# Patient Record
Sex: MALE | Race: WHITE | Employment: FULL TIME | URBAN - METROPOLITAN AREA
[De-identification: names, ages, dates, MRNs, and addresses within clinical notes are randomized per-mention and may not be internally consistent; named-entity substitution may affect disease eponyms.]

---

## 2017-05-08 PROBLEM — E86.0 DEHYDRATION: Status: ACTIVE | Noted: 2017-05-08

## 2017-05-08 PROBLEM — M62.82 RHABDOMYOLYSIS: Status: ACTIVE | Noted: 2017-05-08

## 2017-05-08 PROBLEM — N17.9 AKI (ACUTE KIDNEY INJURY) (HCC): Status: ACTIVE | Noted: 2017-05-08

## 2018-09-26 PROBLEM — E86.0 DEHYDRATION: Status: RESOLVED | Noted: 2017-05-08 | Resolved: 2018-09-26

## 2022-02-25 ENCOUNTER — ANESTHESIA (OUTPATIENT)
Dept: OPERATING ROOM | Age: 62
DRG: 854 | End: 2022-02-25
Payer: COMMERCIAL

## 2022-02-25 ENCOUNTER — HOSPITAL ENCOUNTER (INPATIENT)
Age: 62
LOS: 3 days | Discharge: HOME | DRG: 854 | End: 2022-02-28
Attending: EMERGENCY MEDICINE | Admitting: INTERNAL MEDICINE
Payer: COMMERCIAL

## 2022-02-25 ENCOUNTER — ANESTHESIA EVENT (OUTPATIENT)
Dept: OPERATING ROOM | Age: 62
DRG: 854 | End: 2022-02-25
Payer: COMMERCIAL

## 2022-02-25 ENCOUNTER — APPOINTMENT (OUTPATIENT)
Dept: CT IMAGING | Age: 62
DRG: 854 | End: 2022-02-25
Payer: COMMERCIAL

## 2022-02-25 ENCOUNTER — APPOINTMENT (OUTPATIENT)
Dept: GENERAL RADIOLOGY | Age: 62
DRG: 854 | End: 2022-02-25
Payer: COMMERCIAL

## 2022-02-25 VITALS
RESPIRATION RATE: 2 BRPM | OXYGEN SATURATION: 97 % | SYSTOLIC BLOOD PRESSURE: 122 MMHG | TEMPERATURE: 100.4 F | DIASTOLIC BLOOD PRESSURE: 66 MMHG

## 2022-02-25 DIAGNOSIS — N20.1 URETEROLITHIASIS: Primary | ICD-10-CM

## 2022-02-25 PROBLEM — A41.9 SEPSIS (HCC): Status: ACTIVE | Noted: 2022-02-25

## 2022-02-25 LAB
A/G RATIO: 0.9 (ref 1.1–2.2)
ALBUMIN SERPL-MCNC: 4.1 G/DL (ref 3.4–5)
ALP BLD-CCNC: 135 U/L (ref 40–129)
ALT SERPL-CCNC: 18 U/L (ref 10–40)
ANION GAP SERPL CALCULATED.3IONS-SCNC: 16 MMOL/L (ref 3–16)
AST SERPL-CCNC: 15 U/L (ref 15–37)
BACTERIA: ABNORMAL /HPF
BASOPHILS ABSOLUTE: 0.1 K/UL (ref 0–0.2)
BASOPHILS RELATIVE PERCENT: 0.4 %
BILIRUB SERPL-MCNC: 1.2 MG/DL (ref 0–1)
BILIRUBIN URINE: NEGATIVE
BLOOD, URINE: ABNORMAL
BUN BLDV-MCNC: 14 MG/DL (ref 7–20)
CALCIUM SERPL-MCNC: 9.5 MG/DL (ref 8.3–10.6)
CHLORIDE BLD-SCNC: 96 MMOL/L (ref 99–110)
CLARITY: ABNORMAL
CO2: 22 MMOL/L (ref 21–32)
COLOR: YELLOW
CREAT SERPL-MCNC: 1 MG/DL (ref 0.9–1.3)
EOSINOPHILS ABSOLUTE: 0 K/UL (ref 0–0.6)
EOSINOPHILS RELATIVE PERCENT: 0.1 %
EPITHELIAL CELLS, UA: 2 /HPF (ref 0–5)
GFR AFRICAN AMERICAN: >60
GFR NON-AFRICAN AMERICAN: >60
GLUCOSE BLD-MCNC: 220 MG/DL (ref 70–99)
GLUCOSE BLD-MCNC: 243 MG/DL (ref 70–99)
GLUCOSE BLD-MCNC: 245 MG/DL (ref 70–99)
GLUCOSE BLD-MCNC: 249 MG/DL (ref 70–99)
GLUCOSE BLD-MCNC: 258 MG/DL (ref 70–99)
GLUCOSE BLD-MCNC: 285 MG/DL (ref 70–99)
GLUCOSE URINE: >=1000 MG/DL
HCT VFR BLD CALC: 46.1 % (ref 40.5–52.5)
HEMOGLOBIN: 15.7 G/DL (ref 13.5–17.5)
HYALINE CASTS: 1 /LPF (ref 0–8)
KETONES, URINE: >=80 MG/DL
LACTIC ACID: 1.3 MMOL/L (ref 0.4–2)
LEUKOCYTE ESTERASE, URINE: ABNORMAL
LYMPHOCYTES ABSOLUTE: 0.6 K/UL (ref 1–5.1)
LYMPHOCYTES RELATIVE PERCENT: 2.5 %
MCH RBC QN AUTO: 29.8 PG (ref 26–34)
MCHC RBC AUTO-ENTMCNC: 34.1 G/DL (ref 31–36)
MCV RBC AUTO: 87.4 FL (ref 80–100)
MICROSCOPIC EXAMINATION: YES
MONOCYTES ABSOLUTE: 1.4 K/UL (ref 0–1.3)
MONOCYTES RELATIVE PERCENT: 6.2 %
NEUTROPHILS ABSOLUTE: 20.4 K/UL (ref 1.7–7.7)
NEUTROPHILS RELATIVE PERCENT: 90.8 %
NITRITE, URINE: NEGATIVE
PDW BLD-RTO: 12.7 % (ref 12.4–15.4)
PERFORMED ON: ABNORMAL
PH UA: 5 (ref 5–8)
PLATELET # BLD: 309 K/UL (ref 135–450)
PMV BLD AUTO: 8.2 FL (ref 5–10.5)
POTASSIUM REFLEX MAGNESIUM: 3.8 MMOL/L (ref 3.5–5.1)
PROTEIN UA: ABNORMAL MG/DL
RBC # BLD: 5.27 M/UL (ref 4.2–5.9)
RBC UA: ABNORMAL /HPF (ref 0–4)
SARS-COV-2, NAAT: NOT DETECTED
SODIUM BLD-SCNC: 134 MMOL/L (ref 136–145)
SPECIFIC GRAVITY UA: >1.03 (ref 1–1.03)
TOTAL PROTEIN: 8.7 G/DL (ref 6.4–8.2)
URINE REFLEX TO CULTURE: YES
URINE TYPE: ABNORMAL
UROBILINOGEN, URINE: 1 E.U./DL
WBC # BLD: 22.5 K/UL (ref 4–11)
WBC UA: 358 /HPF (ref 0–5)

## 2022-02-25 PROCEDURE — 2580000003 HC RX 258: Performed by: INTERNAL MEDICINE

## 2022-02-25 PROCEDURE — 6360000002 HC RX W HCPCS: Performed by: EMERGENCY MEDICINE

## 2022-02-25 PROCEDURE — 87086 URINE CULTURE/COLONY COUNT: CPT

## 2022-02-25 PROCEDURE — 3700000001 HC ADD 15 MINUTES (ANESTHESIA): Performed by: UROLOGY

## 2022-02-25 PROCEDURE — 3600000004 HC SURGERY LEVEL 4 BASE: Performed by: UROLOGY

## 2022-02-25 PROCEDURE — 7100000001 HC PACU RECOVERY - ADDTL 15 MIN: Performed by: UROLOGY

## 2022-02-25 PROCEDURE — 85025 COMPLETE CBC W/AUTO DIFF WBC: CPT

## 2022-02-25 PROCEDURE — 3600000014 HC SURGERY LEVEL 4 ADDTL 15MIN: Performed by: UROLOGY

## 2022-02-25 PROCEDURE — 74176 CT ABD & PELVIS W/O CONTRAST: CPT

## 2022-02-25 PROCEDURE — 81001 URINALYSIS AUTO W/SCOPE: CPT

## 2022-02-25 PROCEDURE — 7100000000 HC PACU RECOVERY - FIRST 15 MIN: Performed by: UROLOGY

## 2022-02-25 PROCEDURE — 99284 EMERGENCY DEPT VISIT MOD MDM: CPT

## 2022-02-25 PROCEDURE — C1758 CATHETER, URETERAL: HCPCS | Performed by: UROLOGY

## 2022-02-25 PROCEDURE — 0T768DZ DILATION OF RIGHT URETER WITH INTRALUMINAL DEVICE, VIA NATURAL OR ARTIFICIAL OPENING ENDOSCOPIC: ICD-10-PCS | Performed by: UROLOGY

## 2022-02-25 PROCEDURE — 2709999900 HC NON-CHARGEABLE SUPPLY: Performed by: UROLOGY

## 2022-02-25 PROCEDURE — 2500000003 HC RX 250 WO HCPCS: Performed by: NURSE ANESTHETIST, CERTIFIED REGISTERED

## 2022-02-25 PROCEDURE — 87040 BLOOD CULTURE FOR BACTERIA: CPT

## 2022-02-25 PROCEDURE — 2580000003 HC RX 258: Performed by: UROLOGY

## 2022-02-25 PROCEDURE — 3700000000 HC ANESTHESIA ATTENDED CARE: Performed by: UROLOGY

## 2022-02-25 PROCEDURE — 83036 HEMOGLOBIN GLYCOSYLATED A1C: CPT

## 2022-02-25 PROCEDURE — 96374 THER/PROPH/DIAG INJ IV PUSH: CPT

## 2022-02-25 PROCEDURE — 6360000004 HC RX CONTRAST MEDICATION: Performed by: UROLOGY

## 2022-02-25 PROCEDURE — 87077 CULTURE AEROBIC IDENTIFY: CPT

## 2022-02-25 PROCEDURE — 2060000000 HC ICU INTERMEDIATE R&B

## 2022-02-25 PROCEDURE — 6360000002 HC RX W HCPCS: Performed by: NURSE ANESTHETIST, CERTIFIED REGISTERED

## 2022-02-25 PROCEDURE — 80053 COMPREHEN METABOLIC PANEL: CPT

## 2022-02-25 PROCEDURE — C1769 GUIDE WIRE: HCPCS | Performed by: UROLOGY

## 2022-02-25 PROCEDURE — 6370000000 HC RX 637 (ALT 250 FOR IP): Performed by: UROLOGY

## 2022-02-25 PROCEDURE — 6360000002 HC RX W HCPCS: Performed by: INTERNAL MEDICINE

## 2022-02-25 PROCEDURE — 2580000003 HC RX 258: Performed by: NURSE ANESTHETIST, CERTIFIED REGISTERED

## 2022-02-25 PROCEDURE — 36415 COLL VENOUS BLD VENIPUNCTURE: CPT

## 2022-02-25 PROCEDURE — 6370000000 HC RX 637 (ALT 250 FOR IP): Performed by: INTERNAL MEDICINE

## 2022-02-25 PROCEDURE — C2617 STENT, NON-COR, TEM W/O DEL: HCPCS | Performed by: UROLOGY

## 2022-02-25 PROCEDURE — 83605 ASSAY OF LACTIC ACID: CPT

## 2022-02-25 PROCEDURE — 74420 UROGRAPHY RTRGR +-KUB: CPT

## 2022-02-25 PROCEDURE — 87635 SARS-COV-2 COVID-19 AMP PRB: CPT

## 2022-02-25 PROCEDURE — BT1D1ZZ FLUOROSCOPY OF RIGHT KIDNEY, URETER AND BLADDER USING LOW OSMOLAR CONTRAST: ICD-10-PCS | Performed by: UROLOGY

## 2022-02-25 PROCEDURE — 87150 DNA/RNA AMPLIFIED PROBE: CPT

## 2022-02-25 DEVICE — STENT URET 6FR L26CM PERCFLX HYDR+ TAPR TIP GRAD: Type: IMPLANTABLE DEVICE | Site: URETER | Status: FUNCTIONAL

## 2022-02-25 RX ORDER — INSULIN LISPRO 100 [IU]/ML
0-12 INJECTION, SOLUTION INTRAVENOUS; SUBCUTANEOUS
Status: DISCONTINUED | OUTPATIENT
Start: 2022-02-25 | End: 2022-02-28 | Stop reason: HOSPADM

## 2022-02-25 RX ORDER — POLYETHYLENE GLYCOL 3350 17 G/17G
17 POWDER, FOR SOLUTION ORAL DAILY PRN
Status: DISCONTINUED | OUTPATIENT
Start: 2022-02-25 | End: 2022-02-28 | Stop reason: HOSPADM

## 2022-02-25 RX ORDER — HYDROMORPHONE HCL 110MG/55ML
0.5 PATIENT CONTROLLED ANALGESIA SYRINGE INTRAVENOUS EVERY 5 MIN PRN
Status: DISCONTINUED | OUTPATIENT
Start: 2022-02-25 | End: 2022-02-25 | Stop reason: HOSPADM

## 2022-02-25 RX ORDER — MORPHINE SULFATE 4 MG/ML
4 INJECTION, SOLUTION INTRAMUSCULAR; INTRAVENOUS ONCE
Status: COMPLETED | OUTPATIENT
Start: 2022-02-25 | End: 2022-02-25

## 2022-02-25 RX ORDER — 0.9 % SODIUM CHLORIDE 0.9 %
1000 INTRAVENOUS SOLUTION INTRAVENOUS ONCE
Status: COMPLETED | OUTPATIENT
Start: 2022-02-25 | End: 2022-02-25

## 2022-02-25 RX ORDER — KETOROLAC TROMETHAMINE 30 MG/ML
30 INJECTION, SOLUTION INTRAMUSCULAR; INTRAVENOUS ONCE
Status: DISCONTINUED | OUTPATIENT
Start: 2022-02-25 | End: 2022-02-25

## 2022-02-25 RX ORDER — DEXTROSE MONOHYDRATE 25 G/50ML
12.5 INJECTION, SOLUTION INTRAVENOUS PRN
Status: DISCONTINUED | OUTPATIENT
Start: 2022-02-25 | End: 2022-02-25

## 2022-02-25 RX ORDER — SODIUM CHLORIDE 0.9 % (FLUSH) 0.9 %
5-40 SYRINGE (ML) INJECTION PRN
Status: DISCONTINUED | OUTPATIENT
Start: 2022-02-25 | End: 2022-02-28 | Stop reason: HOSPADM

## 2022-02-25 RX ORDER — DEXTROSE MONOHYDRATE 50 MG/ML
100 INJECTION, SOLUTION INTRAVENOUS PRN
Status: DISCONTINUED | OUTPATIENT
Start: 2022-02-25 | End: 2022-02-28 | Stop reason: HOSPADM

## 2022-02-25 RX ORDER — FENTANYL CITRATE 50 UG/ML
INJECTION, SOLUTION INTRAMUSCULAR; INTRAVENOUS PRN
Status: DISCONTINUED | OUTPATIENT
Start: 2022-02-25 | End: 2022-02-25 | Stop reason: SDUPTHER

## 2022-02-25 RX ORDER — TAMSULOSIN HYDROCHLORIDE 0.4 MG/1
0.4 CAPSULE ORAL DAILY
Status: DISCONTINUED | OUTPATIENT
Start: 2022-02-25 | End: 2022-02-28 | Stop reason: HOSPADM

## 2022-02-25 RX ORDER — SODIUM CHLORIDE 9 MG/ML
25 INJECTION, SOLUTION INTRAVENOUS PRN
Status: DISCONTINUED | OUTPATIENT
Start: 2022-02-25 | End: 2022-02-25 | Stop reason: HOSPADM

## 2022-02-25 RX ORDER — PROPOFOL 10 MG/ML
INJECTION, EMULSION INTRAVENOUS PRN
Status: DISCONTINUED | OUTPATIENT
Start: 2022-02-25 | End: 2022-02-25 | Stop reason: SDUPTHER

## 2022-02-25 RX ORDER — SODIUM CHLORIDE 9 MG/ML
25 INJECTION, SOLUTION INTRAVENOUS PRN
Status: DISCONTINUED | OUTPATIENT
Start: 2022-02-25 | End: 2022-02-28 | Stop reason: HOSPADM

## 2022-02-25 RX ORDER — INSULIN LISPRO 100 [IU]/ML
0-6 INJECTION, SOLUTION INTRAVENOUS; SUBCUTANEOUS NIGHTLY
Status: DISCONTINUED | OUTPATIENT
Start: 2022-02-25 | End: 2022-02-28 | Stop reason: HOSPADM

## 2022-02-25 RX ORDER — ONDANSETRON 4 MG/1
4 TABLET, ORALLY DISINTEGRATING ORAL EVERY 8 HOURS PRN
Status: DISCONTINUED | OUTPATIENT
Start: 2022-02-25 | End: 2022-02-28 | Stop reason: HOSPADM

## 2022-02-25 RX ORDER — MAGNESIUM HYDROXIDE 1200 MG/15ML
LIQUID ORAL
Status: COMPLETED | OUTPATIENT
Start: 2022-02-25 | End: 2022-02-25

## 2022-02-25 RX ORDER — INSULIN LISPRO 100 [IU]/ML
5 INJECTION, SOLUTION INTRAVENOUS; SUBCUTANEOUS
Status: DISCONTINUED | OUTPATIENT
Start: 2022-02-25 | End: 2022-02-26

## 2022-02-25 RX ORDER — NICOTINE POLACRILEX 4 MG
15 LOZENGE BUCCAL PRN
Status: DISCONTINUED | OUTPATIENT
Start: 2022-02-25 | End: 2022-02-28 | Stop reason: HOSPADM

## 2022-02-25 RX ORDER — ONDANSETRON 2 MG/ML
4 INJECTION INTRAMUSCULAR; INTRAVENOUS
Status: DISCONTINUED | OUTPATIENT
Start: 2022-02-25 | End: 2022-02-25 | Stop reason: HOSPADM

## 2022-02-25 RX ORDER — ACETAMINOPHEN 650 MG/1
650 SUPPOSITORY RECTAL EVERY 6 HOURS PRN
Status: DISCONTINUED | OUTPATIENT
Start: 2022-02-25 | End: 2022-02-28 | Stop reason: HOSPADM

## 2022-02-25 RX ORDER — ONDANSETRON 2 MG/ML
INJECTION INTRAMUSCULAR; INTRAVENOUS PRN
Status: DISCONTINUED | OUTPATIENT
Start: 2022-02-25 | End: 2022-02-25 | Stop reason: SDUPTHER

## 2022-02-25 RX ORDER — ACETAMINOPHEN 325 MG/1
650 TABLET ORAL EVERY 6 HOURS PRN
Status: DISCONTINUED | OUTPATIENT
Start: 2022-02-25 | End: 2022-02-28 | Stop reason: HOSPADM

## 2022-02-25 RX ORDER — SODIUM CHLORIDE 0.9 % (FLUSH) 0.9 %
5-40 SYRINGE (ML) INJECTION EVERY 12 HOURS SCHEDULED
Status: DISCONTINUED | OUTPATIENT
Start: 2022-02-25 | End: 2022-02-25 | Stop reason: HOSPADM

## 2022-02-25 RX ORDER — MORPHINE SULFATE 2 MG/ML
2 INJECTION, SOLUTION INTRAMUSCULAR; INTRAVENOUS EVERY 4 HOURS PRN
Status: DISCONTINUED | OUTPATIENT
Start: 2022-02-25 | End: 2022-02-28 | Stop reason: HOSPADM

## 2022-02-25 RX ORDER — SODIUM CHLORIDE 0.9 % (FLUSH) 0.9 %
5-40 SYRINGE (ML) INJECTION EVERY 12 HOURS SCHEDULED
Status: DISCONTINUED | OUTPATIENT
Start: 2022-02-25 | End: 2022-02-28 | Stop reason: HOSPADM

## 2022-02-25 RX ORDER — MEPERIDINE HYDROCHLORIDE 25 MG/ML
12.5 INJECTION INTRAMUSCULAR; INTRAVENOUS; SUBCUTANEOUS EVERY 5 MIN PRN
Status: DISCONTINUED | OUTPATIENT
Start: 2022-02-25 | End: 2022-02-25 | Stop reason: HOSPADM

## 2022-02-25 RX ORDER — SODIUM CHLORIDE 0.9 % (FLUSH) 0.9 %
5-40 SYRINGE (ML) INJECTION PRN
Status: DISCONTINUED | OUTPATIENT
Start: 2022-02-25 | End: 2022-02-25 | Stop reason: HOSPADM

## 2022-02-25 RX ORDER — LIDOCAINE HYDROCHLORIDE 20 MG/ML
INJECTION, SOLUTION EPIDURAL; INFILTRATION; INTRACAUDAL; PERINEURAL PRN
Status: DISCONTINUED | OUTPATIENT
Start: 2022-02-25 | End: 2022-02-25 | Stop reason: SDUPTHER

## 2022-02-25 RX ORDER — SODIUM CHLORIDE 9 MG/ML
INJECTION, SOLUTION INTRAVENOUS CONTINUOUS PRN
Status: DISCONTINUED | OUTPATIENT
Start: 2022-02-25 | End: 2022-02-25 | Stop reason: SDUPTHER

## 2022-02-25 RX ORDER — ONDANSETRON 2 MG/ML
4 INJECTION INTRAMUSCULAR; INTRAVENOUS EVERY 6 HOURS PRN
Status: DISCONTINUED | OUTPATIENT
Start: 2022-02-25 | End: 2022-02-28 | Stop reason: HOSPADM

## 2022-02-25 RX ADMIN — TAMSULOSIN HYDROCHLORIDE 0.4 MG: 0.4 CAPSULE ORAL at 17:50

## 2022-02-25 RX ADMIN — SODIUM CHLORIDE: 9 INJECTION, SOLUTION INTRAVENOUS at 14:56

## 2022-02-25 RX ADMIN — ONDANSETRON 4 MG: 2 INJECTION INTRAMUSCULAR; INTRAVENOUS at 15:17

## 2022-02-25 RX ADMIN — SODIUM CHLORIDE 1000 ML: 9 INJECTION, SOLUTION INTRAVENOUS at 15:30

## 2022-02-25 RX ADMIN — MORPHINE SULFATE 4 MG: 4 INJECTION INTRAVENOUS at 10:43

## 2022-02-25 RX ADMIN — INSULIN LISPRO 2 UNITS: 100 INJECTION, SOLUTION INTRAVENOUS; SUBCUTANEOUS at 21:24

## 2022-02-25 RX ADMIN — SODIUM CHLORIDE: 9 INJECTION, SOLUTION INTRAVENOUS at 15:20

## 2022-02-25 RX ADMIN — INSULIN LISPRO 4 UNITS: 100 INJECTION, SOLUTION INTRAVENOUS; SUBCUTANEOUS at 14:11

## 2022-02-25 RX ADMIN — PROPOFOL 200 MG: 10 INJECTION, EMULSION INTRAVENOUS at 15:03

## 2022-02-25 RX ADMIN — INSULIN LISPRO 4 UNITS: 100 INJECTION, SOLUTION INTRAVENOUS; SUBCUTANEOUS at 17:23

## 2022-02-25 RX ADMIN — INSULIN GLARGINE 15 UNITS: 100 INJECTION, SOLUTION SUBCUTANEOUS at 14:11

## 2022-02-25 RX ADMIN — Medication 1000 MG: at 12:20

## 2022-02-25 RX ADMIN — LIDOCAINE HYDROCHLORIDE 100 MG: 20 INJECTION, SOLUTION EPIDURAL; INFILTRATION; INTRACAUDAL; PERINEURAL at 15:03

## 2022-02-25 RX ADMIN — MORPHINE SULFATE 2 MG: 2 INJECTION, SOLUTION INTRAMUSCULAR; INTRAVENOUS at 13:38

## 2022-02-25 RX ADMIN — INSULIN LISPRO 5 UNITS: 100 INJECTION, SOLUTION INTRAVENOUS; SUBCUTANEOUS at 14:11

## 2022-02-25 RX ADMIN — SODIUM CHLORIDE 1000 ML: 9 INJECTION, SOLUTION INTRAVENOUS at 12:17

## 2022-02-25 RX ADMIN — FENTANYL CITRATE 50 MCG: 50 INJECTION, SOLUTION INTRAMUSCULAR; INTRAVENOUS at 15:12

## 2022-02-25 RX ADMIN — Medication 10 ML: at 21:23

## 2022-02-25 ASSESSMENT — PAIN SCALES - GENERAL
PAINLEVEL_OUTOF10: 6
PAINLEVEL_OUTOF10: 0
PAINLEVEL_OUTOF10: 0
PAINLEVEL_OUTOF10: 3
PAINLEVEL_OUTOF10: 4
PAINLEVEL_OUTOF10: 4

## 2022-02-25 ASSESSMENT — PULMONARY FUNCTION TESTS
PIF_VALUE: 5
PIF_VALUE: 16
PIF_VALUE: 26
PIF_VALUE: 6
PIF_VALUE: 9
PIF_VALUE: 1
PIF_VALUE: 6
PIF_VALUE: 1
PIF_VALUE: 2
PIF_VALUE: 20
PIF_VALUE: 16
PIF_VALUE: 6
PIF_VALUE: 1
PIF_VALUE: 4
PIF_VALUE: 5
PIF_VALUE: 6
PIF_VALUE: 2
PIF_VALUE: 1
PIF_VALUE: 1
PIF_VALUE: 5
PIF_VALUE: 0
PIF_VALUE: 6
PIF_VALUE: 6
PIF_VALUE: 4
PIF_VALUE: 4
PIF_VALUE: 5
PIF_VALUE: 1
PIF_VALUE: 6

## 2022-02-25 ASSESSMENT — PAIN DESCRIPTION - LOCATION: LOCATION: FLANK

## 2022-02-25 ASSESSMENT — PAIN DESCRIPTION - PAIN TYPE: TYPE: ACUTE PAIN

## 2022-02-25 NOTE — ED NOTES
Bedside report given to Deyanira Berger RN. No questions or concerns at this time. Patient transported via wheelchair by Elissa Berry RN.      Malaika Cruz RN  02/25/22 0282

## 2022-02-25 NOTE — PROGRESS NOTES
[facility] Education Nurse  Consult Note       NAME:  Leslie Oneil  MEDICAL RECORD NUMBER:  [id number]  AGE: 63 y.o. GENDER: male  : 1960  TODAY'S DATE:  2022    Subjective:     Reason for Educator consult ---  Evaluation and Assessment:    Leslie Oneil is a 63 y.o. male referred by:   [] Physician  [x] Nursing  [] Other:      Patient states diagnosed with diabetes over 10 years ago, blood sugars came under good control after gastric bypass surgery in  and stopped needing medications at that point. Was on Lantus, Humalog, Metformin and Invokana prior to gastric bypass. The patient does not have a glucometer at home and states stopped testing in  after blood sugars remained stable off of diabetes medications after gastric bypass and therefore will need one upon discharge. Pt endorses polydipsia ,polyuria, polyphasia, and nocturia prior to admission. Reviewed S/S of hyper- and hypoglycemia and the proper treatment of hyper- and hypoglycemia. Explained A1C values and goals. Informed the patient that Hgb A1C has been ordered. the patient states that his A1C was 12.1 in 2021 at a work [employer]. Patient states not counting carbohydrates. Gave recommendations on alternative deserts that pt could try instead of concentrated sweets. Gave recommendations and handout information on diet with diabetes and counting carbohydrates  Encouraged diet compliance to improve pt's health and deter long term complications of DM. Explained will have dietary to come and see for further information on carbohydrate counting. Consulted dietitian   Discussed exercise, sick day management, following dietary plan, following up with PCP. Discussed medications including insulin. Discussed health benefits of non smoking. the patient states he is a non-smoker. Recommend maintaining a smoke-free lifestyle. Patient given pamphlets of written material regarding diabetes titled \" Where do I Begin? \", \" What is Diabetes:\", \"Checking your Blood Sugar\", \"Know your Numbers\", and \"Building a Balanced Meal\". Discussed basal bolus insulin regimen in hospital, hypoglycemia and reviewed handouts. Provided insulin pen instructions. the patient able to do teach back injection with humalog pen for mealtime insuiln. PAST MEDICAL HISTORY      Diagnosis Date    Diabetes mellitus ([address]        PAST SURGICAL HISTORY  Past Surgical History:   Procedure Laterality Date    CHOLECYSTECTOMY      CYSTOSCOPY Right 2/25/2022    CYSTOSCOPY, RIGHT RETROGRADE PYELOGRAM, PLACEMENT OF RIGHT URETERAL STENT performed by Delores Wise MD at [facility]      gastric sleeve    HERNIA REPAIR      PANCREAS SURGERY         FAMILY HISTORY  Family History   Problem Relation Age of Onset    High Blood Pressure Mother     Diabetes Father        SOCIAL HISTORY  Social History     Tobacco Use    Smoking status: Never Smoker    Smokeless tobacco: Not on file   Substance Use Topics    Alcohol use: No    Drug use: No       ALLERGIES  Allergies   Allergen Reactions    Dye [Gadolinium Derivatives]      From past MRI       MEDICATIONS  No current facility-administered medications on file prior to encounter. No current outpatient medications on file prior to encounter.        Objective:     LABS    CBC:   Lab Results   Component Value Date    WBC 22.5 02/25/2022    RBC 5.27 02/25/2022    HGB 15.7 02/25/2022    HCT 46.1 02/25/2022    MCV 87.4 02/25/2022    MCH 29.8 02/25/2022    MCHC 34.1 02/25/2022    RDW 12.7 02/25/2022     02/25/2022    MPV 8.2 02/25/2022     CMP:    Lab Results   Component Value Date     02/25/2022    K 3.8 02/25/2022    CL 96 02/25/2022    CO2 22 02/25/2022    BUN 14 02/25/2022    CREATININE 1.0 02/25/2022    GFRAA >60 02/25/2022    AGRATIO 0.9 02/25/2022    LABGLOM >60 02/25/2022    GLUCOSE 285 02/25/2022    PROT 8.7 02/25/2022    LABALBU 4.1 02/25/2022    CALCIUM 9.5 02/25/2022 BILITOT 1.2 02/25/2022    ALKPHOS 135 02/25/2022    AST 15 02/25/2022    ALT 18 02/25/2022       HgBA1c:  No results found for: LABA1C  Current A1C ordered and pending    This patient's last creatinine was   Recent Labs     02/25/22  1045   CREATININE 1.0        Recent Blood sugars have been   Lab Results   Component Value Date    POCGLU 243 02/25/2022    POCGLU 245 02/25/2022    POCGLU 258 02/25/2022    POCGLU 249 02/25/2022    POCGLU 135 05/10/2017    POCGLU 115 05/10/2017    POCGLU 139 05/09/2017    POCGLU 132 05/09/2017     Lab Results   Component Value Date    GLUCOSE 285 02/25/2022    GLUCOSE 123 05/10/2017    GLUCOSE 182 05/09/2017    GLUCOSE 151 05/08/2017    GLUCOSE 148 05/08/2017            Assessment:     Patient Active Problem List   Diagnosis    SUGAR (acute [facility] injury) [address]    Rhabdomyolysis    Sepsis ([facility]       Plan:     Plan of Care:   Diabetes Type 2 un-controlled  Lipids-- unknown & untreated  Renal- creatinine today 1, eGFR >60  ACE/ARB: no  DVT prophylaxis: on Lovenox  Current DM tx: basal, prandial and medium correction scale  Would recommend continuing with current insulin orders at this time. Current control unknown. A1C ordered and pending    Consult placed to dietitian for further education  Nursing to assist patient with new insulin start education with each accucheck and insulin administration. Patient given new insulin start information. Nursing to continue to teach insulin injections while hospitalized. Continue to monitor blood sugars to determine adequacy of current dosages  Patient would like prescriptions on discharge to be filled at [facility] in [place] [address]. Message sent to Dr. Ovidio Caraballo with recommendations      Discharge Plan:  Patient will continue to need insulin at home: suggest full basal bolus insulin therapy  Patient to find & f/u with a PCP. Instructed to log blood sugars and take blood sugar log to his f/u appointment.     Gabriela More RN, BSN, [facility], OMS.

## 2022-02-25 NOTE — CONSULTS
[facility] Consult Note  [facility]    Provider: Cruz Rasheed MD MD Patient ID:  Admission Date: 2022 Name: Noris Butt  OR Date: n/a MRN: [id number]   Patient Location: OR/NONE : 1960  Attending: Juan Mullins MD Date of Service: 2022  PCP: No primary care provider on file. Diagnoses:  1. Ureterolithiasis      Ureteral stone   Urinary tract infection    Assessment/Plan:  62 yo M with a right 8 mm proximal ureteral stone , urine with bacteriuria, leukocystosis, tachycardic and febrile. First stone episode. - to OR for emergenct RIGHT stent placement  - will need definitive stone treatment after infection cleared  - UCX empiric abx      All the patients questions were answered in detail. He understands the plan as listed above. Review/order of labs  Review/order of radiology studies  Independent review and interpretation of test or study   Total time spent face-to-face with the patient 10 min, including greater than 50% of this time in discussion with the patient/family concerning the following:  Recommended tests  management options  risks/benefits of management options  importance of compliance  Prognosis  Risk factor reduction  Patient/family education                                                                                                                                                      CC:   Chief Complaint   Patient presents with    Flank Pain     Right sided, aching flank pain with nausea since Tuesday. Denies difficulty with urination/bowels. Hx of DM. HPI: Noris Butt is a 63 y.o. male. I saw the patient today for obstructing stone and UTI    Past medical History:   He has a past medical history of Diabetes mellitus ([address] Past Surgical History:  He has a past surgical history that includes Gastric bypass surgery; Cholecystectomy; hernia repair; and Pancreas surgery. Allergies:    Allergies   Allergen Reactions    Dye [Gadolinium Derivatives]      From past MRI       Social History:  He reports that he has never smoked. He does not have any smokeless tobacco history on file. He reports that he does not drink alcohol and does not use drugs. Family History:  family history includes Diabetes in his father; High Blood Pressure in his mother. Medications:   Scheduled Meds:   cefTRIAXone (ROCEPHIN) IV  1,000 mg IntraVENous Q24H    sodium chloride flush  5-40 mL IntraVENous 2 times per day    enoxaparin  40 mg SubCUTAneous Daily    insulin lispro  0-12 Units SubCUTAneous TID WC    insulin lispro  0-6 Units SubCUTAneous Nightly    sodium chloride  1,000 mL IntraVENous Once    insulin glargine  15 Units SubCUTAneous Daily    insulin lispro  5 Units SubCUTAneous TID WC     Continuous Infusions:   sodium chloride      dextrose       PRN Meds:sodium chloride flush, sodium chloride, ondansetron **OR** ondansetron, polyethylene glycol, acetaminophen **OR** acetaminophen, morphine, glucose, glucagon (rDNA), dextrose, dextrose bolus (hypoglycemia) **OR** dextrose bolus (hypoglycemia)    Review of Systems:  Constitutional: Negative for fever    Genitourinary: see HPI  Eyes: negative for sudden change in vision  EENT: no complaints  Cardiovascular: Negative for chest pain  Respiratory: Negative for shortness of breath  Gastrointestinal: nausea  Musculoskeletal:  back pain   Neurological: Negative for weakness  Psychiatric: Negative for anxiety  Integumentary: Negative for rashes or adenopathy     Physical Exam:  Vitals:    02/25/22 1434   BP: (!) 147/79   Pulse: 117   Resp: 18   Temp: 101.9 °F (38.8 °C)   SpO2: 96%     Constitutional: NAD, well-developed, well-nourished. HEENT: MMM. Hearing intact. PERRL  Neck: no thyroid masses appreciated. Trachea is midline. Neck appears unremarkable   Lymph: no palpable adenopathy in supraclavicular, or axillary lymph nodes  Cardiovascular: Regular rate.  No peripheral edema  Respiratory: Respirations are even and non-labored. No audible breath sounds. Genitourinary:deferred   Abdomen: Soft. No distension, tenderness, hernias, masses or guarding. RIGHT CVA tenderness. No hernias appreciated. Liver and spleen appear normal  Psychiatric: A + O x 3, normal affect. Insight appears intact. Muskuloskeletal: LOIDA x 4   Skin: Pink, warm and dry. No rashes on face and arms. Labs:  Lab Results   Component Value Date    WBC 22.5 (H) 02/25/2022    HGB 15.7 02/25/2022    HCT 46.1 02/25/2022    MCV 87.4 02/25/2022     02/25/2022     Lab Results   Component Value Date    CREATININE 1.0 02/25/2022    BUN 14 02/25/2022     (L) 02/25/2022    K 3.8 02/25/2022    CL 96 (L) 02/25/2022    CO2 22 02/25/2022     No results found for: PSA     Imaging:   Narrative   EXAMINATION:   CT OF THE ABDOMEN AND PELVIS WITHOUT CONTRAST 2/25/2022 8:26 am       TECHNIQUE:   CT of the abdomen and pelvis was performed without the administration of   intravenous contrast. Multiplanar reformatted images are provided for review. Dose modulation, iterative reconstruction, and/or weight based adjustment of   the mA/kV was utilized to reduce the radiation dose to as low as reasonably   achievable.       COMPARISON:   None.       HISTORY:   ORDERING SYSTEM PROVIDED HISTORY: stone? TECHNOLOGIST PROVIDED HISTORY:   Reason for exam:->stone? Additional Contrast?->None   Decision Support Exception - unselect if not a suspected or confirmed   emergency medical condition->Emergency Medical Condition (MA)   Reason for Exam: right flank pain/ vomiting       FINDINGS:   Lower Chest:  Visualized portion of the lower chest demonstrates no acute   abnormality.       Organs: Diffuse hepatic steatosis.  No intrahepatic biliary ductal dilation.    Status post cholecystectomy.  Status post distal pancreatectomy.  Few   punctate benign calcifications within the spleen.  The bilateral adrenal   glands are unremarkable.  The left kidney is unremarkable without evidence of   hydronephrosis.  Mild right hydronephrosis secondary to proximal partially   obstructing ureteral stone measuring 0.8 cm.       GI/Bowel:  There is no evidence of bowel obstruction.  No evidence of   abnormal bowel wall thickening or distension.  The appendix is visualized and   is unremarkable.  No evidence of acute appendicitis.       Pelvis: Under distended bladder.  No acute abnormality of the prostate.       Peritoneum/Retroperitoneum: No evidence of ascites or free air.  No evidence   of lymphadenopathy.  Aorta is normal in caliber.       Bones/Soft Tissues:  No acute abnormality of the visualized osseous   structures.  Visualized soft tissues are unremarkable.           Impression   Mild right hydronephrosis secondary to partially obstructing proximal   ureteral stone.       RECOMMENDATIONS:   Unavailable             Cruz Rasheed MD, MD  2/25/2022

## 2022-02-25 NOTE — PROGRESS NOTES
Pt admitted to room 3380. Here with R sided flank pain. No issues with urinating. Current pain level 3/10. A & Ox4, ambulatory. First bolus infusing. BP elevated. Pt to have a cysto with stent placed soon with Dr Miesha Gray. Pt has been on insulin in the past, diabetic educator consult placed. Call light in reach. Denies further needs.

## 2022-02-25 NOTE — OP NOTE
Urology Operative Report  [facility]    Provider: Elpidio Mosqueda MD MD Patient ID:  Admission Date: 2022 Name: Margi Rey  OR Date: 2022  MRN: [id number]   Patient Location: OR/NONE : 1960  Attending: Emperatriz Aggarwal MD Date of Service: 2022  PCP: No primary care provider on file. Date of Operation: 2022    Preoperative Diagnosis:   1. RIGHT side ureteral stone  2. UTI    Postoperative Diagnosis: same    Procedure:    1. Cystoscopy  2. Right side ureteral stent placement  3. Fluoroscopic imaging < 1 hr physician time  4. RIGHT side retrograde ureteropyelogram    Surgeon:   Elpidio Mosqueda MD,    Anesthesia: General LMA anesthesia    Indications: Margi Rey is a 63 y.o. male who presents for the above named surgery. Informed consent was obtained and the risks, benefits, and details of the procedure were explained to the patient who elected to proceed. Details of Procedure: The patient was brought to the operating room and placed in the supine position on the operating room table. SCDs were placed on the lower extremities. Following induction of anesthesia the patient was positioned in a lithotomy position, all pressure points were padded, and the genitals were prepped and draped in the usual sterile fashion. A routine timeout was performed, confirming the patient, procedure, site, risk of fire, patient allergies and confirming that preoperative antibiotics had been administered prior to beginning. A 21 fr rigid cystoscope was advance via a normal appearing urethra into the bladder. The meatus and fossa was narrow which required us to use the obturator to enter the urethra. The bladder was inspected and there were no suspicious lesions, stones or diverticula seen. Attention was turned to the right ureteral orifice. A 0.035 sensor wire was advance into the right UO and up to the renal pelvis under control of fluoroscopy.  There was resistance felt at the expected level of the stone based on the CT scan. Over the wire a 5 fr pollock catheter was positioned proximal to the level of the stone. The wire was removed and a RUPG was done showing hydro. The wire was replaced proximal to the stone, and the Elane Fearing was removed. Over the wire a stent was advanced under control of fluoroscopy with good curl in the renal pelvis and the urinary bladder. Cloudy infected appearing urine returned. The bladder was emptied and the scope removed    At the end of the procedure all counts were correct. The patient tolerated the procedure well and was transported to the PACU in stable condition. Findings: infected urine from right kidney, narrowed meatus/fossa     Estimated Blood Loss: minimal                  Drains: 6fr x 26 cm right ureteral stent          Specimens: none    Complications: none apparent           Disposition:  PACU - hemodynamically stable.      Plan: Needs abx and plan on outpt follow up in ~1-2 weeks for definitive stone treatment            Georgiana Regan MD  2/25/2022

## 2022-02-25 NOTE — ED PROVIDER NOTES
[address] [facility]  [facility]      Pt Name: Laz Bailey  MRN: [id number]  [place] 1960  Date ofevaluation: 2/25/2022  Provider: Familia Chapin MD    CHIEF COMPLAINT       Chief Complaint   Patient presents with    Flank Pain     Right sided, aching flank pain with nausea since Tuesday. Denies difficulty with urination/bowels. Hx of DM. HPI    HISTORY OF PRESENT ILLNESS   (Location/Symptom, Timing/Onset,Context/Setting, Quality, Duration, Modifying Factors, Severity)  Note limiting factors. Laz Bailey is a 63 y.o. male who presents to the emergency department right-sided flank pain. This is a 70-year-old male who presents with right-sided flank pain has had since Tuesday. He has had some nausea and vomiting. He denies any fevers or chills. He denies any history of kidney stones. NursingNotes were reviewed. Review of Systems    REVIEW OF SYSTEMS    (2-9 systems for level 4, 10 or more for level 5)     Review of Systems   Constitutional: Negative for fever. HENT: Negative for rhinorrhea and sore throat. Eyes: Negative for redness. Respiratory: Negative for shortness of breath. Cardiovascular: Negative for chest pain. Gastrointestinal: Negative for abdominal pain. Positive for right-sided flank pain. Genitourinary: Negative for flank pain. Neurological: Negative for headaches. Hematological: Negative for adenopathy. Psychiatric/Behavioral: Negative for confusion. Except as noted above the remainder of the review of systems was reviewed and negative.        PAST MEDICAL HISTORY     Past Medical History:   Diagnosis Date    Diabetes mellitus ([facility] [address]          SURGICALHISTORY       Past Surgical History:   Procedure Laterality Date    CHOLECYSTECTOMY      GASTRIC BYPASS SURGERY      gastric sleeve    HERNIA REPAIR      PANCREAS SURGERY           CURRENT MEDICATIONS       Previous Medications    No medications on file       ALLERGIES     Dye [gadolinium derivatives]    FAMILY HISTORY       Family History   Problem Relation Age of Onset    High Blood Pressure Mother     Diabetes Father           SOCIAL HISTORY       Social History     Socioeconomic History    Marital status: Single     Spouse name: None    Number of children: None    Years of education: None    Highest education level: None   Occupational History    Occupation: insurance   Tobacco Use    Smoking status: Never Smoker    Smokeless tobacco: None   Substance and Sexual Activity    Alcohol use: No    Drug use: No    Sexual activity: None   Other Topics Concern    None   Social History Narrative    None     Social Determinants of Health     Financial Resource Strain:     Difficulty of Paying Living Expenses: Not on file   Food Insecurity:     Worried About Running Out of Food in the Last Year: Not on file    Rasheeda of Food in the Last Year: Not on file   Transportation Needs:     Lack of Transportation (Medical): Not on file    Lack of Transportation (Non-Medical):  Not on file   Physical Activity:     Days of Exercise per Week: Not on file    Minutes of Exercise per Session: Not on file   Stress:     Feeling of Stress : Not on file   Social Connections:     Frequency of Communication with Friends and Family: Not on file    Frequency of Social Gatherings with Friends and Family: Not on file    Attends Spiritism Services: Not on file    Active Member of [address] [place] [address] or Organizations: Not on file    Attends Club or Organization Meetings: Not on file    Marital Status: Not on file   Intimate Partner Violence:     Fear of Current or Ex-Partner: Not on file    Emotionally Abused: Not on file    Physically Abused: Not on file    Sexually Abused: Not on file   Housing Stability:     Unable to Pay for Housing in the Last Year: Not on file    Number of [place] in the Last Year: Not on file    Unstable Housing in the Last Year: Not on file SCREENINGS             PHYSICAL EXAM    (up to 7 for level 4, 8 or more for level 5)     ED Triage Vitals [02/25/22 0744]   BP Temp Temp src Pulse Resp SpO2 Height Weight   (!) 148/89 98 °F (36.7 °C) -- 95 18 99 % -- --       Physical Exam:      General Appearance:  Alert, cooperative, appears stated age. Head:  Normocephalic, without obvious abnormality, atraumatic. Eyes:  conjunctiva/corneas clear, EOM's intact. Sclera anicteric. ENT:  Mucous remains moist and pink   Neck: Supple, symmetrical, trachea midline, no adenopathy. No jugular venous distention. Lungs:    Clear to auscultation bilaterally with good breath sounds   Chest Wall:   No pain to palpation   Heart:   Genitourinary:  Regular rate rhythm with no murmurs rubs gallops  Unremarkable   Abdomen:    Soft and benign. No guarding or rebound. Left-sided flank pain tenderness. Extremities:  No clubbing cyanosis or edema   Pulses:  Good throughout   Skin:  No rashes or lesions to exposed skin. Neurologic: Alert and oriented X 3. DIAGNOSTIC RESULTS         RADIOLOGY:   Non-plain filmimages such as CT, Ultrasound and MRI are read by the radiologist. Plain radiographic images are visualized and preliminarily interpreted by the emergency physician with the below findings:    See below    Interpretation per the Radiologist below, if available at the time ofthis note: All incidental findings were discussed with the patient. CT ABDOMEN PELVIS WO CONTRAST Additional Contrast? None   Final Result   Mild right hydronephrosis secondary to partially obstructing proximal   ureteral stone.       RECOMMENDATIONS:   Unavailable               ED BEDSIDE ULTRASOUND:   Performed by ED Physician - none    LABS:  Labs Reviewed   URINALYSIS WITH REFLEX TO CULTURE - Abnormal; Notable for the following components:       Result Value    Clarity, UA CLOUDY (*)     Glucose, Ur >=1000 (*)     Ketones, Urine >=80 (*)     Blood, Urine MODERATE (*) Protein, UA TRACE (*)     Leukocyte Esterase, Urine SMALL (*)     All other components within normal limits    Narrative:     Performed at:  [facility]  [address] [facility], [address]   Phone [phone]   MICROSCOPIC URINALYSIS - Abnormal; Notable for the following components:    Bacteria, UA 4+ (*)     WBC,  (*)     All other components within normal limits    Narrative:     Performed at:  [facility]  [address] [facility], [address]   Phone [phone]   CULTURE, URINE   COVID-19, RAPID   CBC WITH AUTO DIFFERENTIAL   COMPREHENSIVE METABOLIC PANEL W/ REFLEX TO MG FOR LOW K       All other labs were within normal range or not returned as of this dictation. EMERGENCY DEPARTMENT COURSE and DIFFERENTIAL DIAGNOSIS/MDM:   Vitals:    Vitals:    02/25/22 0744 02/25/22 1022   BP: (!) 148/89 (!) 146/83   Pulse: 95 111   Resp: 18 14   Temp: 98 °F (36.7 °C)    SpO2: 99% 97%           MDM    The patient has remained stable throughout his hospital course. A urinalysis was obtained that appears to be grossly infected. A CT of the abdomen pelvis was obtained that shows a large 8 mm stone in the right proximal ureter with some hydronephrosis. On reexamination, the patient continues to be in pain. I have ordered morphine IV. The patient states he does not do well with NSAIDs because it has caused kidney problems in the past.  I have contacted urology who will be taking care of the patient definitively. The patient be admitted for further care and expected surgery. He is in stable condition. Laboratory results are pending. He has been made n.p.o.    REASSESSMENT              CONSULTS:  IP CONSULT TO UROLOGY    PROCEDURES:  Unless otherwise noted below, none     Procedures    FINAL IMPRESSION      1.  Ureterolithiasis          DISPOSITION/PLAN   DISPOSITION Decision To Admit 02/25/2022 10:43:05 AM      PATIENT REFERREDTO:  No follow-up provider specified. DISCHARGEMEDICATIONS:  New Prescriptions    No medications on file     Controlled Substances Monitoring:     No flowsheet data found.     (Please note that portions of this note were completed with a voice recognition program.  Efforts were made to edit the dictations but occasionally words are mis-transcribed.)    Anjum Max MD (electronically signed)  Attending Emergency Physician          Anjum Max MD  02/25/22 [phone]

## 2022-02-25 NOTE — ACP (ADVANCE CARE PLANNING)
Advanced Care Planning Note. Purpose of Encounter: Advanced care planning in light of hospitalization  Parties In Attendance: Patient,    Decisional Capacity: Yes  Subjective: Patient  understand that this conversation is to address long term care goal  Objective:  To hospital with sepsis and nephrolithiasis uncontrolled diabetes mellitus  Goals of Care Determination: Patient would pursue CPR and intubation if required would want family to make a decision if long-term trach or vent if needed  Code Status: full code  Time spent on Advanced care Plannin minutes  Advanced Care Planning Documents: documented patient's wishes, would like Cha Daugherty to make medical decisions if unable to make decisions

## 2022-02-25 NOTE — ANESTHESIA PRE PROCEDURE
Department of Anesthesiology  Preprocedure Note       Name:  Barbara Jarrlel   Age:  63 y.o.  :  1960                                          MRN:  [id number]         Date:  2022      Surgeon: Ana Evans):  Leena Vu MD    Procedure: Procedure(s):  CYSTOSCOPY, RIGHT RETROGRADE PYELOGRAM, PLACEMENT OF RIGHT URETERAL STENT    Medications prior to admission:   Prior to Admission medications    Not on File       Current medications:    Current Facility-Administered Medications   Medication Dose Route Frequency Provider Last Rate Last Admin    cefTRIAXone (ROCEPHIN) 1000 mg in sterile water 10 mL IV syringe  1,000 mg IntraVENous Q24H Sandra Esquivel MD   1,000 mg at 22 1220    sodium chloride flush 0.9 % injection 5-40 mL  5-40 mL IntraVENous 2 times per day Sandra Esquivel MD        sodium chloride flush 0.9 % injection 5-40 mL  5-40 mL IntraVENous PRN Sandra Esquivel MD        0.9 % sodium chloride infusion  25 mL IntraVENous PRN Sandra Esquivel MD        enoxaparin (LOVENOX) injection 40 mg  40 mg SubCUTAneous Daily Sandra Esquivel MD        ondansetron (ZOFRAN-ODT) disintegrating tablet 4 mg  4 mg Oral Q8H PRN Sandra Esquivel MD        Or    ondansetron (ZOFRAN) injection 4 mg  4 mg IntraVENous Q6H PRN Sandra Esquivel MD        polyethylene glycol (GLYCOLAX) packet 17 g  17 g Oral Daily PRN Sandra Esquivel MD        acetaminophen (TYLENOL) tablet 650 mg  650 mg Oral Q6H PRN Sandra Esquivel MD        Or    acetaminophen (TYLENOL) suppository 650 mg  650 mg Rectal Q6H PRN Sandra Esquivel MD        insulin lispro (1 Unit Dial) 0-12 Units  0-12 Units SubCUTAneous TID WC Sandra Esquivel MD   4 Units at 22 1411    insulin lispro (1 Unit Dial) 0-6 Units  0-6 Units SubCUTAneous Nightly Sandra Esquivel MD        morphine (PF) injection 2 mg  2 mg IntraVENous Q4H PRN Sandra Esquivel MD   2 mg at 22 1338    0.9 % sodium chloride bolus  1,000 mL IntraVENous Once Emperatriz Aggarwal MD        insulin glargine (LANTUS;BASAGLAR) injection pen 15 Units  15 Units SubCUTAneous Daily Emperatriz Aggarwal MD   15 Units at 02/25/22 1411    insulin lispro (1 Unit Dial) 5 Units  5 Units SubCUTAneous TID WC Emperatriz Aggarwal MD   5 Units at 02/25/22 1411    glucose (GLUTOSE) 40 % oral gel 15 g  15 g Oral PRN Emperatriz Aggarwal MD        glucagon (rDNA) injection 1 mg  1 mg IntraMUSCular PRN Emperatriz Aggarwal MD        dextrose 5 % solution  100 mL/hr IntraVENous PRN Emperatriz Aggarwal MD        dextrose bolus (hypoglycemia) 10% 125 mL  125 mL IntraVENous PRN Emperatriz Aggarwal MD        Or    dextrose bolus (hypoglycemia) 10% 250 mL  250 mL IntraVENous PRN Emperatriz Aggarwal MD           Allergies: Allergies   Allergen Reactions    Dye [Gadolinium Derivatives]      From past MRI       Problem List:    Patient Active Problem List   Diagnosis Code    SUGAR (acute kidney injury) ([facility] [address] N17.9    Rhabdomyolysis M62.82    Sepsis ([facility] [address] A41.9       Past Medical History:        Diagnosis Date    Diabetes mellitus ([facility] [address]        Past Surgical History:        Procedure Laterality Date    CHOLECYSTECTOMY      GASTRIC BYPASS SURGERY      gastric sleeve    HERNIA REPAIR      PANCREAS SURGERY         Social History:    Social History     Tobacco Use    Smoking status: Never Smoker    Smokeless tobacco: Not on file   Substance Use Topics    Alcohol use:  No                                Counseling given: Not Answered      Vital Signs (Current):   Vitals:    02/25/22 1145 02/25/22 1200 02/25/22 1215 02/25/22 1434   BP: (!) 154/92 (!) 147/87 137/86 (!) 147/79   Pulse: 112 109 109 117   Resp: 14 15 17 18   Temp:   97.5 °F (36.4 °C) 101.9 °F (38.8 °C)   TempSrc:   Temporal Temporal   SpO2: 95% 91% 95% 96%                                              BP Readings from Last 3 Encounters:   02/25/22 (!) 147/79   05/10/17 119/67       NPO Status: Time of last liquid consumption: 0000 Time of last solid consumption: 0000                        Date of last liquid consumption: 02/24/22                        Date of last solid food consumption: 02/24/22    BMI:   Wt Readings from Last 3 Encounters:   05/10/17 279 lb (126.6 kg)     There is no height or weight on file to calculate BMI.    CBC:   Lab Results   Component Value Date    WBC 22.5 02/25/2022    RBC 5.27 02/25/2022    HGB 15.7 02/25/2022    HCT 46.1 02/25/2022    MCV 87.4 02/25/2022    RDW 12.7 02/25/2022     02/25/2022       CMP:   Lab Results   Component Value Date     02/25/2022    K 3.8 02/25/2022    CL 96 02/25/2022    CO2 22 02/25/2022    BUN 14 02/25/2022    CREATININE 1.0 02/25/2022    GFRAA >60 02/25/2022    AGRATIO 0.9 02/25/2022    LABGLOM >60 02/25/2022    GLUCOSE 285 02/25/2022    PROT 8.7 02/25/2022    CALCIUM 9.5 02/25/2022    BILITOT 1.2 02/25/2022    ALKPHOS 135 02/25/2022    AST 15 02/25/2022    ALT 18 02/25/2022       POC Tests:   Recent Labs     02/25/22  1339   POCGLU 249*       Coags: No results found for: PROTIME, INR, APTT    HCG (If Applicable): No results found for: PREGTESTUR, PREGSERUM, HCG, HCGQUANT     ABGs: No results found for: PHART, PO2ART, ZXD3QDK, CNQ9TAA, BEART, U2NYUPYG     Type & Screen (If Applicable):  No results found for: LABABO, LABRH    Drug/Infectious Status (If Applicable):  No results found for: HIV, HEPCAB    COVID-19 Screening (If Applicable):   Lab Results   Component Value Date    COVID19 Not Detected 02/25/2022           Anesthesia Evaluation  Patient summary reviewed and Nursing notes reviewed no history of anesthetic complications:   Airway: Mallampati: II        Dental:          Pulmonary:                              Cardiovascular:  Exercise tolerance: poor (<4 METS),                     Neuro/Psych:   (+) neuromuscular disease:,             GI/Hepatic/Renal:   (+) renal disease:,           Endo/Other:    (+) Diabetes, .                  Abdominal: Vascular:           Other Findings:             Anesthesia Plan      general and MAC     ASA 3 - emergent                                 Antione Rodriguez MD   2/25/2022

## 2022-02-25 NOTE — PROGRESS NOTES
note    Right renal colic  CT with 8mm right prox stone  +UTI  Tachycardia  Leukocytosis 23k    recc    Send rapid covid  Keep NPO for urgent cysto right stent insertion this afternoon with Dr. Ki Patton, CNP  02/25/2022

## 2022-02-26 LAB
A/G RATIO: 0.8 (ref 1.1–2.2)
ALBUMIN SERPL-MCNC: 3.1 G/DL (ref 3.4–5)
ALP BLD-CCNC: 92 U/L (ref 40–129)
ALT SERPL-CCNC: 12 U/L (ref 10–40)
ANION GAP SERPL CALCULATED.3IONS-SCNC: 14 MMOL/L (ref 3–16)
AST SERPL-CCNC: 12 U/L (ref 15–37)
BASOPHILS ABSOLUTE: 0.1 K/UL (ref 0–0.2)
BASOPHILS RELATIVE PERCENT: 0.3 %
BILIRUB SERPL-MCNC: 1 MG/DL (ref 0–1)
BUN BLDV-MCNC: 17 MG/DL (ref 7–20)
CALCIUM SERPL-MCNC: 8.5 MG/DL (ref 8.3–10.6)
CHLORIDE BLD-SCNC: 99 MMOL/L (ref 99–110)
CO2: 20 MMOL/L (ref 21–32)
CREAT SERPL-MCNC: 0.8 MG/DL (ref 0.9–1.3)
EOSINOPHILS ABSOLUTE: 0 K/UL (ref 0–0.6)
EOSINOPHILS RELATIVE PERCENT: 0.2 %
ESTIMATED AVERAGE GLUCOSE: 294.8 MG/DL
GFR AFRICAN AMERICAN: >60
GFR NON-AFRICAN AMERICAN: >60
GLUCOSE BLD-MCNC: 151 MG/DL (ref 70–99)
GLUCOSE BLD-MCNC: 181 MG/DL (ref 70–99)
GLUCOSE BLD-MCNC: 184 MG/DL (ref 70–99)
GLUCOSE BLD-MCNC: 214 MG/DL (ref 70–99)
GLUCOSE BLD-MCNC: 246 MG/DL (ref 70–99)
HBA1C MFR BLD: 11.9 %
HCT VFR BLD CALC: 39.8 % (ref 40.5–52.5)
HEMOGLOBIN: 13.6 G/DL (ref 13.5–17.5)
LYMPHOCYTES ABSOLUTE: 1.2 K/UL (ref 1–5.1)
LYMPHOCYTES RELATIVE PERCENT: 8.3 %
MCH RBC QN AUTO: 30.3 PG (ref 26–34)
MCHC RBC AUTO-ENTMCNC: 34.3 G/DL (ref 31–36)
MCV RBC AUTO: 88.5 FL (ref 80–100)
MONOCYTES ABSOLUTE: 1.4 K/UL (ref 0–1.3)
MONOCYTES RELATIVE PERCENT: 9.2 %
NEUTROPHILS ABSOLUTE: 12.3 K/UL (ref 1.7–7.7)
NEUTROPHILS RELATIVE PERCENT: 82 %
ORGANISM: ABNORMAL
PDW BLD-RTO: 12.8 % (ref 12.4–15.4)
PERFORMED ON: ABNORMAL
PLATELET # BLD: 255 K/UL (ref 135–450)
PMV BLD AUTO: 7.9 FL (ref 5–10.5)
POTASSIUM REFLEX MAGNESIUM: 3.8 MMOL/L (ref 3.5–5.1)
RBC # BLD: 4.5 M/UL (ref 4.2–5.9)
REPORT: NORMAL
SODIUM BLD-SCNC: 133 MMOL/L (ref 136–145)
TOTAL PROTEIN: 7 G/DL (ref 6.4–8.2)
URINE CULTURE, ROUTINE: ABNORMAL
WBC # BLD: 15 K/UL (ref 4–11)

## 2022-02-26 PROCEDURE — 99223 1ST HOSP IP/OBS HIGH 75: CPT | Performed by: INTERNAL MEDICINE

## 2022-02-26 PROCEDURE — 2060000000 HC ICU INTERMEDIATE R&B

## 2022-02-26 PROCEDURE — 6370000000 HC RX 637 (ALT 250 FOR IP): Performed by: UROLOGY

## 2022-02-26 PROCEDURE — 80053 COMPREHEN METABOLIC PANEL: CPT

## 2022-02-26 PROCEDURE — 36415 COLL VENOUS BLD VENIPUNCTURE: CPT

## 2022-02-26 PROCEDURE — 6370000000 HC RX 637 (ALT 250 FOR IP): Performed by: INTERNAL MEDICINE

## 2022-02-26 PROCEDURE — 2580000003 HC RX 258: Performed by: UROLOGY

## 2022-02-26 PROCEDURE — 87040 BLOOD CULTURE FOR BACTERIA: CPT

## 2022-02-26 PROCEDURE — 6360000002 HC RX W HCPCS: Performed by: UROLOGY

## 2022-02-26 PROCEDURE — 85025 COMPLETE CBC W/AUTO DIFF WBC: CPT

## 2022-02-26 RX ORDER — INSULIN LISPRO 100 [IU]/ML
7 INJECTION, SOLUTION INTRAVENOUS; SUBCUTANEOUS
Status: DISCONTINUED | OUTPATIENT
Start: 2022-02-26 | End: 2022-02-28 | Stop reason: HOSPADM

## 2022-02-26 RX ADMIN — ACETAMINOPHEN 650 MG: 325 TABLET ORAL at 16:14

## 2022-02-26 RX ADMIN — Medication 10 ML: at 20:18

## 2022-02-26 RX ADMIN — INSULIN GLARGINE 15 UNITS: 100 INJECTION, SOLUTION SUBCUTANEOUS at 08:52

## 2022-02-26 RX ADMIN — INSULIN LISPRO 2 UNITS: 100 INJECTION, SOLUTION INTRAVENOUS; SUBCUTANEOUS at 18:35

## 2022-02-26 RX ADMIN — INSULIN LISPRO 2 UNITS: 100 INJECTION, SOLUTION INTRAVENOUS; SUBCUTANEOUS at 12:02

## 2022-02-26 RX ADMIN — INSULIN LISPRO 5 UNITS: 100 INJECTION, SOLUTION INTRAVENOUS; SUBCUTANEOUS at 08:52

## 2022-02-26 RX ADMIN — INSULIN GLARGINE 5 UNITS: 100 INJECTION, SOLUTION SUBCUTANEOUS at 16:09

## 2022-02-26 RX ADMIN — INSULIN LISPRO 7 UNITS: 100 INJECTION, SOLUTION INTRAVENOUS; SUBCUTANEOUS at 18:35

## 2022-02-26 RX ADMIN — Medication 10 ML: at 08:52

## 2022-02-26 RX ADMIN — TAMSULOSIN HYDROCHLORIDE 0.4 MG: 0.4 CAPSULE ORAL at 08:51

## 2022-02-26 RX ADMIN — INSULIN LISPRO 4 UNITS: 100 INJECTION, SOLUTION INTRAVENOUS; SUBCUTANEOUS at 08:52

## 2022-02-26 RX ADMIN — ENOXAPARIN SODIUM 40 MG: 40 INJECTION SUBCUTANEOUS at 08:51

## 2022-02-26 RX ADMIN — INSULIN LISPRO 1 UNITS: 100 INJECTION, SOLUTION INTRAVENOUS; SUBCUTANEOUS at 20:18

## 2022-02-26 RX ADMIN — SODIUM CHLORIDE, PRESERVATIVE FREE 10 ML: 5 INJECTION INTRAVENOUS at 11:58

## 2022-02-26 RX ADMIN — INSULIN LISPRO 7 UNITS: 100 INJECTION, SOLUTION INTRAVENOUS; SUBCUTANEOUS at 12:03

## 2022-02-26 RX ADMIN — Medication 1000 MG: at 11:53

## 2022-02-26 ASSESSMENT — PAIN SCALES - GENERAL
PAINLEVEL_OUTOF10: 0
PAINLEVEL_OUTOF10: 1
PAINLEVEL_OUTOF10: 0

## 2022-02-26 ASSESSMENT — ENCOUNTER SYMPTOMS
DIARRHEA: 0
EYE REDNESS: 0
CONSTIPATION: 0
WHEEZING: 0
COUGH: 0
SHORTNESS OF BREATH: 0
ABDOMINAL PAIN: 0
SORE THROAT: 0
NAUSEA: 0
TROUBLE SWALLOWING: 0
RHINORRHEA: 0
EYE DISCHARGE: 0
BACK PAIN: 0

## 2022-02-26 NOTE — PROGRESS NOTES
[facility]    2/26/2022 11:00 AM        Name: Marguerite Perry . Admitted: 2/25/2022  Primary Care Provider: No primary care provider on file. (Tel: None)          Subjective:        Reviewed interval ancillary notes    Current Medications  [START ON 2/27/2022] insulin glargine (LANTUS;BASAGLAR) injection pen 20 Units, Daily  insulin glargine (LANTUS;BASAGLAR) injection pen 5 Units, Nightly  insulin lispro (1 Unit Dial) 7 Units, TID WC  cefTRIAXone (ROCEPHIN) 1000 mg in sterile water 10 mL IV syringe, Q24H  sodium chloride flush 0.9 % injection 5-40 mL, 2 times per day  sodium chloride flush 0.9 % injection 5-40 mL, PRN  0.9 % sodium chloride infusion, PRN  enoxaparin (LOVENOX) injection 40 mg, Daily  ondansetron (ZOFRAN-ODT) disintegrating tablet 4 mg, Q8H PRN   Or  ondansetron (ZOFRAN) injection 4 mg, Q6H PRN  polyethylene glycol (GLYCOLAX) packet 17 g, Daily PRN  acetaminophen (TYLENOL) tablet 650 mg, Q6H PRN   Or  acetaminophen (TYLENOL) suppository 650 mg, Q6H PRN  insulin lispro (1 Unit Dial) 0-12 Units, TID WC  insulin lispro (1 Unit Dial) 0-6 Units, Nightly  morphine (PF) injection 2 mg, Q4H PRN  glucose (GLUTOSE) 40 % oral gel 15 g, PRN  glucagon (rDNA) injection 1 mg, PRN  dextrose 5 % solution, PRN  dextrose bolus (hypoglycemia) 10% 125 mL, PRN   Or  dextrose bolus (hypoglycemia) 10% 250 mL, PRN  tamsulosin (FLOMAX) capsule 0.4 mg, Daily        Objective:  /75   Pulse 87   Temp 97.2 °F (36.2 °C) (Temporal)   Resp 18   SpO2 94%     Intake/Output Summary (Last 24 hours) at 2/26/2022 1100  Last data filed at 2/26/2022 0848  Gross per 24 hour   Intake 1000 ml   Output 980 ml   Net 20 ml      Wt Readings from Last 3 Encounters:   05/10/17 279 lb (126.6 kg)       General appearance:  Appears comfortable.  AAOx3  HEENT: atraumatic, Pupils equal, muscous membranes moist, no masses appreciated  Cardiovascular: Regular rate and rhythm no murmurs appreciated  Respiratory: CTAB no wheezing  Gastrointestinal: Abdomen soft, non-tender, BS+  EXT: no edema  Neurology: no gross focal deficts  Psychiatry: Appropriate affect. Not agitated  Skin: Warm, dry, no rashes appreciated    Labs and Tests:  CBC:   Recent Labs     02/25/22  1045 02/26/22  0528   WBC 22.5* 15.0*   HGB 15.7 13.6    255     BMP:    Recent Labs     02/25/22  1045 02/26/22  0528   * 133*   K 3.8 3.8   CL 96* 99   CO2 22 20*   BUN 14 17   CREATININE 1.0 0.8*   GLUCOSE 285* 246*     Hepatic:   Recent Labs     02/25/22  1045 02/26/22  0528   AST 15 12*   ALT 18 12   BILITOT 1.2* 1.0   ALKPHOS 135* 92     FL RETROGRADE PYELOGRAM RIGHT   Final Result   Intraprocedural fluoroscopic spot images as above. See separate procedure   report for more information. CT ABDOMEN PELVIS WO CONTRAST Additional Contrast? None   Final Result   Mild right hydronephrosis secondary to partially obstructing proximal   ureteral stone. RECOMMENDATIONS:   Unavailable             Recent imaging reviewed    Problem List  Principal Problem:    Sepsis ([address]  Resolved Problems:    * No resolved hospital problems.  *  Assessment/Plan:   Sepsis secondary to mild right hydronephrosis with ureteral stone with UTI   and strep agalactiae bacteremia  - s/p right ureteral stent  - iv rocephin  - repeat blood cultures fu urine culutre  - id consult  - urology onboard    Dm2 with hyperglycemia non complaint with medication  - increase insulin and monitor  - a1c pneindg        DVT prophylaxis lovenox  Code status full      Angelica Weiner MD   2/26/2022 11:00 AM

## 2022-02-26 NOTE — CONSULTS
Infectious Diseases   Consult Note        Admission Date: 2/25/2022  Hospital Day: Hospital Day: 2   Attending: Ifrah Wells MD  Date of service: 2/26/22     Reason for admission: Ureterolithiasis [N20.1]  Sepsis [facility]) [A41.9]    Chief complaint/ Reason for consult: Sepsis, group B streptococcus bacteremia    Microbiology:        I have reviewed allavailable micro lab data and cultures    · Blood culture (2/2) - collected on 2/25/2022: Group B streptococcus  · Urine culture  - collected on 2/25/2022: Greater than 100,000 CFU per mL of group B streptococcus      Antibiotics and immunizations:       Current antibiotics: All antibiotics and their doses were reviewed by me    Recent Abx Admin                   cefTRIAXone (ROCEPHIN) 1000 mg in sterile water 10 mL IV syringe (mg) 1,000 mg Given 02/26/22 1153                  Immunization History: All immunization history was reviewed by me today. Immunization History   Administered Date(s) Administered    COVID-19, Pfizer Purple top, DILUTE for use, 12+ yrs, 30mcg/0.3mL dose 03/18/2021, 04/08/2021       Known drug allergies: All allergies were reviewed and updated    Allergies   Allergen Reactions    Dye [Gadolinium Derivatives]      From past MRI       Social history:     Social History:  All social andepidemiologic history was reviewed and updated by me today as needed. · Tobacco use:   reports that he has never smoked. He does not have any smokeless tobacco history on file. · Alcohol use:   reports no history of alcohol use. · [place]  ·  reports no history of drug use.      COVID VACCINATION AND LAB RESULT RECORDS:     Internal Administration   First Dose COVID-19, Pfizer Purple top, DILUTE for use, 12+ yrs, 30mcg/0.3mL dose  03/18/2021   Second Dose COVID-19, Pfizer Purple top, DILUTE for use, 12+ yrs, 30mcg/0.3mL dose   04/08/2021       Last COVID Lab SARS-CoV-2, NAAT (no units)   Date Value   02/25/2022 Not Detected            Assessment:     The patient is a 63 y.o. old male who  has a past medical history of Diabetes mellitus ([facility] [address] with following problems:    · Sepsis on admission with high fever, leukocytosis  · Group B streptococcus bacteremia  · Nausea and vomiting  · Right-sided hydronephrosis with the partially obstructing proximal right ureteral stone on CT scan which was retrograde pyelography and cystoscopy with right ureteral stent placement on 2/25/2022  · Negative COVID-19 NAAT test on 2/25/2022  · Type 2 diabetes mellitus in obese  · Obesity Class 2 due to excess calorie intake      Discussion:      The patient has been ill with nausea and vomiting and right-sided flank pain and sepsis syndrome with a high fever, tachycardia, bradycardia    Blood cultures from admission are growing group B streptococcus. The patient has undergone cystoscopy with a right ureteral stent placement yesterday for partially obstructing right ureteral stone. The group B streptococcal bacteremia may very well be of urinary source. Plan:     Diagnostic Workup:    · Will order 2D echo to rule out endocarditis  · Will order 2 sets of repeat blood cultures  · Continue to follow fever curve, WBC count and blood cultures  · Follow up on liverand renal functions closely    Antimicrobials:    · Will continue IV ceftriaxone  · Increase IV ceftriaxone from 1 g every 24 hours to 2 g every 24 hours  Recommend holding off on PICC line until blood cultures clear for at least 48 and preferably 72 hours. · Midline or regular central venous line okay for now, if needed for IV access. · We will follow up on the culture results and clinical progress and will make further recommendations accordingly. · Continue close vitals monitoring. · Maintain good glycemic control. · Fall precautions. Aspiration precautions. · Continue to watch for new fever or diarrhea. · DVT prophylaxis.   · Discussed all above with patient and RN.      Drug Monitoring:    · Continue serial monitoring for antibiotic toxicity as follows: CBC, CMP  · Continue to watch for following: new or worsening fever, hypotension, hives, lip swelling and redness or purulence at vascular access sites. I/v access Management:    · Continue to monitor i.v access sites for erythema, induration, discharge or tenderness. · As always, continue efforts to minimizetubes/lines/drains as clinically appropriate to reduce chances of line associated infections. Current isolation precautions: There are no current isolations documented for this patient. Level of complexity of consult: High     Risk of Complications/Morbidity: High     · Illness(es)/ Infection present that pose threat to life/bodily function. · There is potential for severe exacerbation of infection/side effects of treatment. · Therapy requires intensive monitoring for antimicrobial agent toxicity. Thank you for involving me in the care of your patient. I will continue to follow. If you have any additional questions, please do not hesitate to contact me. Subjective:     Presenting complaint in ER:     Chief Complaint   Patient presents with    Flank Pain     Right sided, aching flank pain with nausea since Tuesday. Denies difficulty with urination/bowels. Hx of DM. HPI: Jeni Bettencourt is a 63 y.o. male patient, who was seen at the request of Dr. Lorenzo Joseph MD.    History was obtained from chart review and the patient. The patient was admitted on 2/25/2022. I have been consulted to see the patient for above mentioned reason(s). The patient has multiple medical comorbidities, and presented to the ER for right-sided flank pain that was going on since Tuesday and patient also complained of nausea and vomiting. In the ER, patient had a high fever of 101.9 elevated white cell count of 22,500. He was admitted. Blood cultures were sent.   He was started on empiric IV ceftriaxone    Blood culture has grown group B streptococcus    I have been asked for my opinion for management for this patient. Past Medical History: All past medical history reviewed today. Past Medical History:   Diagnosis Date    Diabetes mellitus ([address]          Past Surgical History: All pastsurgical history was reviewed today. Past Surgical History:   Procedure Laterality Date    CHOLECYSTECTOMY      CYSTOSCOPY Right 2/25/2022    CYSTOSCOPY, RIGHT RETROGRADE PYELOGRAM, PLACEMENT OF RIGHT URETERAL STENT performed by Heri Ruelas MD at [address] [place] [address],[facility] Floor      gastric sleeve    HERNIA REPAIR      PANCREAS SURGERY           Family History: All family history was reviewed today. Problem Relation Age of Onset    High Blood Pressure Mother     Diabetes Father          Medications: All current and past medications were reviewed. No medications prior to admission.  [START ON 2/27/2022] insulin glargine  20 Units SubCUTAneous Daily    insulin lispro  7 Units SubCUTAneous TID WC    insulin glargine  5 Units SubCUTAneous Once    cefTRIAXone (ROCEPHIN) IV  1,000 mg IntraVENous Q24H    sodium chloride flush  5-40 mL IntraVENous 2 times per day    enoxaparin  40 mg SubCUTAneous Daily    insulin lispro  0-12 Units SubCUTAneous TID WC    insulin lispro  0-6 Units SubCUTAneous Nightly    tamsulosin  0.4 mg Oral Daily          REVIEW OF SYSTEMS:       Review of Systems   Constitutional: Positive for fatigue. Negative for chills, diaphoresis and fever. HENT: Negative for ear discharge, ear pain, rhinorrhea, sore throat and trouble swallowing. Eyes: Negative for discharge and redness. Respiratory: Negative for cough, shortness of breath and wheezing. Cardiovascular: Negative for chest pain and leg swelling. Gastrointestinal: Negative for abdominal pain, constipation, diarrhea and nausea. Endocrine: Negative for polyuria.    Genitourinary: Negative for dysuria, flank pain, frequency, hematuria and urgency. Musculoskeletal: Negative for back pain and myalgias. Skin: Negative for rash. Neurological: Negative for dizziness, seizures and headaches. Hematological: Does not bruise/bleed easily. Psychiatric/Behavioral: Negative for hallucinations and suicidal ideas. All other systems reviewed and are negative. Objective:       PHYSICAL EXAM:      Vitals:   Vitals:    02/25/22 2359 02/26/22 0457 02/26/22 0729 02/26/22 1145   BP: 117/73 122/76 121/75 124/80   Pulse: 103 93 87 94   Resp: 18 16 18 18   Temp: 97.4 °F (36.3 °C) 97 °F (36.1 °C) 97.2 °F (36.2 °C) 97.2 °F (36.2 °C)   TempSrc: Temporal Temporal Temporal Temporal   SpO2: 92% 95% 94% 95%       Physical Exam  Vitals and nursing note reviewed. Constitutional:       Appearance: Normal appearance. He is well-developed. HENT:      Head: Normocephalic and atraumatic. Right Ear: External ear normal.      Left Ear: External ear normal.      Nose: Nose normal. No congestion or rhinorrhea. Mouth/Throat:      Mouth: Mucous membranes are moist.      Pharynx: No oropharyngeal exudate or posterior oropharyngeal erythema. Eyes:      General: No scleral icterus. Right eye: No discharge. Left eye: No discharge. Conjunctiva/sclera: Conjunctivae normal.      Pupils: Pupils are equal, round, and reactive to light. Cardiovascular:      Rate and Rhythm: Normal rate and regular rhythm. Pulses: Normal pulses. Heart sounds: No murmur heard. No friction rub. Pulmonary:      Effort: Pulmonary effort is normal. No respiratory distress. Breath sounds: Normal breath sounds. No stridor. No wheezing, rhonchi or rales. Abdominal:      General: Bowel sounds are normal.      Palpations: Abdomen is soft. Tenderness: There is no abdominal tenderness. There is no right CVA tenderness, left CVA tenderness, guarding or rebound.    Musculoskeletal: General: No swelling or tenderness. Normal range of motion. Cervical back: Normal range of motion and neck supple. No rigidity. No muscular tenderness. Lymphadenopathy:      Cervical: No cervical adenopathy. Skin:     General: Skin is warm and dry. Coloration: Skin is not jaundiced. Findings: No erythema or rash. Neurological:      General: No focal deficit present. Mental Status: He is alert and oriented to person, place, and time. Mental status is at baseline. Motor: No abnormal muscle tone. Psychiatric:         Mood and Affect: Mood normal.         Behavior: Behavior normal.         Thought Content: Thought content normal.           Lines and drains: All vascular access sites are healthy with no local erythema, discharge or tenderness. Intake and output:     I/O last 3 completed shifts: In: 700 [I.V.:700]  Out: 101 [address] Guy    Lab Data:   All available labs were reviewed by me today. CBC:   Recent Labs     02/25/22  1045 02/26/22  0528   WBC 22.5* 15.0*   RBC 5.27 4.50   HGB 15.7 13.6   HCT 46.1 39.8*    255   MCV 87.4 88.5   MCH 29.8 30.3   MCHC 34.1 34.3   RDW 12.7 12.8        BMP:  Recent Labs     02/25/22  1045 02/26/22  0528   * 133*   K 3.8 3.8   CL 96* 99   CO2 22 20*   BUN 14 17   CREATININE 1.0 0.8*   CALCIUM 9.5 8.5   GLUCOSE 285* 246*        Hepatic FunctionPanel:   Lab Results   Component Value Date    ALKPHOS 92 02/26/2022    ALT 12 02/26/2022    AST 12 02/26/2022    PROT 7.0 02/26/2022    BILITOT 1.0 02/26/2022    LABALBU 3.1 02/26/2022       CPK:   Lab Results   Component Value Date    CKTOTAL 363 (H) 05/08/2017     ESR: No results found for: SEDRATE  CRP: No results found for: CRP      Imaging: All pertinent images and reports for the current visit were reviewed by me during this visit. I reviewed the chest x-ray/CT scan/MRI images and independently interpreted the findings and results today.     FL RETROGRADE PYELOGRAM RIGHT   Final Result   Intraprocedural fluoroscopic spot images as above. See separate procedure   report for more information. CT ABDOMEN PELVIS WO CONTRAST Additional Contrast? None   Final Result   Mild right hydronephrosis secondary to partially obstructing proximal   ureteral stone. RECOMMENDATIONS:   Unavailable             Outside records:    Labs, Microbiology, Radiology and pertinent results from Care everywhere, if available, were reviewed as a part ofthe consultation. Problem list:       Patient Active Problem List   Diagnosis Code    SUGAR (acute kidney injury) ([facility] [address] N17.9    Rhabdomyolysis M62.82    Sepsis ([facility] [address] A41.9    Ureterolithiasis N20.1    Nausea and vomiting R11.2    Hydronephrosis, right N13.30    Class 2 obesity due to excess calories with body mass index (BMI) of 38.0 to 38.9 in adult E66.09, Z68.38    Diabetes mellitus type 2 in obese (HCC) E11.69, E66.9         Please note that this chart was generated using Dragon dictation software. Although every effort was made to ensure the accuracy of this automated transcription, some errors in transcription may have occurred inadvertently. If you may need any clarification, please do not hesitate to contact me through EPIC or at the phone number provided below with my electronic signature. Any pictures or media included in this note were obtained after taking informed verbal consent from the patient and with their approval to include those in the patient's medical record.         Carlie Newman MD, MPH, [address] [place] [address]  2/26/2022, 1:01 PM  [facility] Infectious Disease   [address] [place] [address], [address] [facility] [address], [address] [place] [address]  Office: [phone]  Fax: [phone]  Clinic days:  Tuesday & Thursday

## 2022-02-26 NOTE — PROGRESS NOTES
Urology Progress Note  [facility]    Provider: Ritchie Goodman MD MD Patient ID:  Admission Date: 2022 Name: Eamon Teixeira  OR Date: 2022 MRN: [id number]   Patient Location: [id number] : [id number]  Attending: Jose Manuel Rodriguez MD Date of Service: 2022  PCP: No primary care provider on file. Diagnoses:  1. Ureterolithiasis       Ureteral stone   Urinary tract infection     Assessment/Plan:  62 yo M with a right 8 mm proximal ureteral stone , urine with bacteriuria, leukocystosis, tachycardic and febrile. First stone episode. Blood cuture +. Now s/p RIGHT ureteral stent placement . HR improved. UCx still pending        - continue stent, flomax, pain control  - will need definitive stone treatment after infection cleared- outpt fu requested  - UCX empiric abx, control BG  - pending culture results likely stable for dc today or tomorrow per IM      The patient had a chance to ask questions which were answered. He understands the above plan. Subjective:   Eamon Teixeira is a 63 y.o. male. He was seen and examined this morning.  Feels great no more fevers and pain controlled     Objective:   Vitals:  Vitals:    22 0729   BP: 121/75   Pulse: 87   Resp: 18   Temp: 97.2 °F (36.2 °C)   SpO2: 94%       Intake/Output Summary (Last 24 hours) at 2022 0935  Last data filed at 2022 0848  Gross per 24 hour   Intake 940 ml   Output 980 ml   Net -40 ml       Physical Exam:  Gen: Alert and oriented x3, no acute distress      Labs:  Lab Results   Component Value Date    WBC 15.0 (H) 2022    HGB 13.6 2022    HCT 39.8 (L) 2022    MCV 88.5 2022     2022     Lab Results   Component Value Date    CREATININE 0.8 (L) 2022    BUN 17 2022     (L) 2022    K 3.8 2022    CL 99 2022    CO2 20 (L) 2022       Ritchie Goodman MD MD  2022

## 2022-02-26 NOTE — PROGRESS NOTES
Nutrition Education    · Verbally reviewed information with Patient  · Educated on [facility]  · Written educational materials provided. · Contact name and number provided. · Refer to Patient Education activity for more details.     Electronically signed by Monika Hooper RD, VITALIY on 2/26/22 at 9:11 AM EST    Contact: [phone]

## 2022-02-27 LAB
A/G RATIO: 1 (ref 1.1–2.2)
ALBUMIN SERPL-MCNC: 3.4 G/DL (ref 3.4–5)
ALP BLD-CCNC: 92 U/L (ref 40–129)
ALT SERPL-CCNC: 13 U/L (ref 10–40)
ANION GAP SERPL CALCULATED.3IONS-SCNC: 11 MMOL/L (ref 3–16)
AST SERPL-CCNC: 17 U/L (ref 15–37)
BASOPHILS ABSOLUTE: 0.1 K/UL (ref 0–0.2)
BASOPHILS RELATIVE PERCENT: 0.6 %
BILIRUB SERPL-MCNC: 0.5 MG/DL (ref 0–1)
BUN BLDV-MCNC: 16 MG/DL (ref 7–20)
CALCIUM SERPL-MCNC: 9 MG/DL (ref 8.3–10.6)
CHLORIDE BLD-SCNC: 101 MMOL/L (ref 99–110)
CO2: 24 MMOL/L (ref 21–32)
CREAT SERPL-MCNC: 0.8 MG/DL (ref 0.9–1.3)
EOSINOPHILS ABSOLUTE: 0.3 K/UL (ref 0–0.6)
EOSINOPHILS RELATIVE PERCENT: 3 %
GFR AFRICAN AMERICAN: >60
GFR NON-AFRICAN AMERICAN: >60
GLUCOSE BLD-MCNC: 138 MG/DL (ref 70–99)
GLUCOSE BLD-MCNC: 144 MG/DL (ref 70–99)
GLUCOSE BLD-MCNC: 146 MG/DL (ref 70–99)
GLUCOSE BLD-MCNC: 151 MG/DL (ref 70–99)
GLUCOSE BLD-MCNC: 174 MG/DL (ref 70–99)
HCT VFR BLD CALC: 40.5 % (ref 40.5–52.5)
HEMOGLOBIN: 14 G/DL (ref 13.5–17.5)
LYMPHOCYTES ABSOLUTE: 1.8 K/UL (ref 1–5.1)
LYMPHOCYTES RELATIVE PERCENT: 19 %
MCH RBC QN AUTO: 30.5 PG (ref 26–34)
MCHC RBC AUTO-ENTMCNC: 34.5 G/DL (ref 31–36)
MCV RBC AUTO: 88.3 FL (ref 80–100)
MONOCYTES ABSOLUTE: 1.3 K/UL (ref 0–1.3)
MONOCYTES RELATIVE PERCENT: 13.4 %
NEUTROPHILS ABSOLUTE: 6.1 K/UL (ref 1.7–7.7)
NEUTROPHILS RELATIVE PERCENT: 64 %
PDW BLD-RTO: 12.7 % (ref 12.4–15.4)
PERFORMED ON: ABNORMAL
PLATELET # BLD: 282 K/UL (ref 135–450)
PMV BLD AUTO: 8 FL (ref 5–10.5)
POTASSIUM REFLEX MAGNESIUM: 4.3 MMOL/L (ref 3.5–5.1)
RBC # BLD: 4.59 M/UL (ref 4.2–5.9)
SODIUM BLD-SCNC: 136 MMOL/L (ref 136–145)
TOTAL PROTEIN: 6.8 G/DL (ref 6.4–8.2)
WBC # BLD: 9.4 K/UL (ref 4–11)

## 2022-02-27 PROCEDURE — 6370000000 HC RX 637 (ALT 250 FOR IP): Performed by: INTERNAL MEDICINE

## 2022-02-27 PROCEDURE — 99233 SBSQ HOSP IP/OBS HIGH 50: CPT | Performed by: INTERNAL MEDICINE

## 2022-02-27 PROCEDURE — 80053 COMPREHEN METABOLIC PANEL: CPT

## 2022-02-27 PROCEDURE — 2580000003 HC RX 258: Performed by: UROLOGY

## 2022-02-27 PROCEDURE — 2060000000 HC ICU INTERMEDIATE R&B

## 2022-02-27 PROCEDURE — 87040 BLOOD CULTURE FOR BACTERIA: CPT

## 2022-02-27 PROCEDURE — 6370000000 HC RX 637 (ALT 250 FOR IP): Performed by: UROLOGY

## 2022-02-27 PROCEDURE — 36415 COLL VENOUS BLD VENIPUNCTURE: CPT

## 2022-02-27 PROCEDURE — 6360000002 HC RX W HCPCS: Performed by: UROLOGY

## 2022-02-27 PROCEDURE — 85025 COMPLETE CBC W/AUTO DIFF WBC: CPT

## 2022-02-27 RX ADMIN — ENOXAPARIN SODIUM 40 MG: 40 INJECTION SUBCUTANEOUS at 08:27

## 2022-02-27 RX ADMIN — INSULIN GLARGINE 20 UNITS: 100 INJECTION, SOLUTION SUBCUTANEOUS at 08:26

## 2022-02-27 RX ADMIN — INSULIN LISPRO 7 UNITS: 100 INJECTION, SOLUTION INTRAVENOUS; SUBCUTANEOUS at 17:49

## 2022-02-27 RX ADMIN — INSULIN LISPRO 7 UNITS: 100 INJECTION, SOLUTION INTRAVENOUS; SUBCUTANEOUS at 08:26

## 2022-02-27 RX ADMIN — Medication 10 ML: at 19:37

## 2022-02-27 RX ADMIN — Medication 10 ML: at 08:30

## 2022-02-27 RX ADMIN — INSULIN LISPRO 2 UNITS: 100 INJECTION, SOLUTION INTRAVENOUS; SUBCUTANEOUS at 17:49

## 2022-02-27 RX ADMIN — TAMSULOSIN HYDROCHLORIDE 0.4 MG: 0.4 CAPSULE ORAL at 08:28

## 2022-02-27 RX ADMIN — INSULIN LISPRO 7 UNITS: 100 INJECTION, SOLUTION INTRAVENOUS; SUBCUTANEOUS at 12:26

## 2022-02-27 RX ADMIN — INSULIN LISPRO 2 UNITS: 100 INJECTION, SOLUTION INTRAVENOUS; SUBCUTANEOUS at 12:26

## 2022-02-27 RX ADMIN — INSULIN LISPRO 2 UNITS: 100 INJECTION, SOLUTION INTRAVENOUS; SUBCUTANEOUS at 08:25

## 2022-02-27 ASSESSMENT — ENCOUNTER SYMPTOMS
SHORTNESS OF BREATH: 0
DIARRHEA: 0
SORE THROAT: 0
EYE DISCHARGE: 0
EYE REDNESS: 0
COUGH: 0
RHINORRHEA: 0
CONSTIPATION: 0
ABDOMINAL PAIN: 0
WHEEZING: 0
NAUSEA: 0
TROUBLE SWALLOWING: 0
BACK PAIN: 0

## 2022-02-27 ASSESSMENT — PAIN SCALES - GENERAL
PAINLEVEL_OUTOF10: 0

## 2022-02-27 NOTE — PROGRESS NOTES
Infectious Diseases   Progress Note      Admission Date: 2/25/2022  Hospital Day: Hospital Day: 3   Attending: Goyo Narvaez MD  Date of service: 2/27/2022     Chief complaint/ Reason for consult:     · Sepsis on admission with high fever, leukocytosis  · Group B streptococcus bacteremia  · Nausea and vomiting  · Right-sided hydronephrosis with the partially obstructing proximal right ureteral stone on CT scan which was retrograde pyelography and cystoscopy with right ureteral stent placement on 2/25/2022    Microbiology:        I have reviewed allavailable micro lab data and cultures    · Blood culture (2/2) - collected on 2/25/2022: Group B streptococcus    · Urine culture  - collected on 2/25/2022: Greater than 100,000 CFU per mL of group B streptococcus      Antibiotics and immunizations:       Current antibiotics: All antibiotics and their doses were reviewed by me    Recent Abx Admin      No antibiotic orders with administrations found. Immunization History: All immunization history was reviewed by me today. Immunization History   Administered Date(s) Administered    COVID-19, Pfizer Purple top, DILUTE for use, 12+ yrs, 30mcg/0.3mL dose 03/18/2021, 04/08/2021       Known drug allergies: All allergies were reviewed and updated    Allergies   Allergen Reactions    Dye [Gadolinium Derivatives]      From past MRI       Social history:     Social History:  All social andepidemiologic history was reviewed and updated by me today as needed. · Tobacco use:   reports that he has never smoked. He does not have any smokeless tobacco history on file. · Alcohol use:   reports no history of alcohol use. · Currently lives in: [facility]  ·  reports no history of drug use.      COVID VACCINATION AND LAB RESULT RECORDS:     Internal Administration   First Dose COVID-19, Pfizer Purple top, DILUTE for use, 12+ yrs, 30mcg/0.3mL dose  03/18/2021   Second Dose COVID-19, DragonRAD top, DILUTE for use, 12+ yrs, 30mcg/0.3mL dose   04/08/2021       Last COVID Lab SARS-CoV-2, NAAT (no units)   Date Value   02/25/2022 Not Detected            Assessment:     The patient is a 63 y.o. old male who  has a past medical history of Diabetes mellitus ([facility] [address] with following problems:    · Sepsis on admission with high fever, leukocytosis-improving  · Group B streptococcus bacteremia-covered with ceftriaxone  · Nausea and vomiting-improved  · Right-sided hydronephrosis with the partially obstructing proximal right ureteral stone on CT scan which was retrograde pyelography and cystoscopy with right ureteral stent placement on 2/25/2022  · Negative COVID-19 NAAT test on 2/25/2022  · Type 2 diabetes mellitus in obese-counseling done  · Obesity Class 2 due to excess calorie intake  ·       Discussion:      The patient is afebrile. Blood and urine cultures from admission are positive for group B streptococcus. He is on IV ceftriaxone. Repeat blood cultures have been ordered by me yesterday. They have been collected and are in process. Serum creatinine 0.8 today    Plan:     Diagnostic Workup:    · Follow-up on 2D echo  · Follow-up on repeat blood cultures from yesterday  · Continue to follow  fever curve, WBC count and blood cultures. · Continue to monitor blood counts, liver and renal function. Antimicrobials:    · Will continue IV ceftriaxone 2 g every 24 hour  · Monitor to monitor his vitals closely  · No need for any isolation  · We will follow up on the culture results and clinical progress and will make further recommendations accordingly. · Continue close vitals monitoring. · Maintain good glycemic control. · Urology note reviewed. · Fall precautions. Aspiration precautions. · Continue to watch for new fever or diarrhea. · DVT prophylaxis. · Discussed all above with patient and RN.       Drug Monitoring:    · Continue monitoring for antibiotic toxicity as follows: CBC, CMP · Continue to watch for following: new or worsening fever, new hypotension, hives, lip swelling and redness or purulence at vascular access sites. I/v access Management:    · Continue to monitor i.v access sites for erythema, induration, discharge or tenderness. · As always, continue efforts to minimize tubes/lines/drains as clinically appropriate to reduce chances of line associated infections. Patient education and counseling:        · The patient was educated in detail about the side-effects of various antibiotics and things to watch for like new rashes, lip swelling, severe reaction, worsening diarrhea, break through fever etc.  · Discussed patient's condition and what to expect. All of the patient's questions were addressed in a satisfactory manner and patient verbalized understanding all instructions. Level of complexity of visit: High     Diabetes mellitus education and counseling:    Patient was educated in detail about the importance of keeping diabetes under control to improve the cure rate of infection and prevent future infections. Patient was advised to check blood glucose level regularly and to stay compliant with the diabetes medications. Patient was advised to the trim the toe nails carefully, wear shoes or slippers at all times and check both feet everyday before going to bed to look for any cuts, blisters, swelling or redness. Importance of washing the feet everyday with soap and water and keeping them dry, and seeking prompt medical attention in case of a non-healing wound or ulcer on the feet was also highlighted. TIME SPENT TODAY:     - Spent over  36 minutes on visit (including interval history, physical exam, review of data including labs, cultures, imaging, development and implementation of treatment plan and coordination of complex care).  More than 50 percent of this includes face-to-face time spent with the patient for counseling and coordination of care.        Thank you for involving me in the care of your patient. I will continue to follow. If you have anyadditional questions, please do not hesitate to contact me. Subjective: Interval history: Interval history was obtained from chart review and patient/ RN. The patient is afebrile. He is tolerating antibiotics okay. No diarrhea     REVIEW OF SYSTEMS:      Review of Systems   Constitutional: Positive for fatigue. Negative for chills, diaphoresis and fever. HENT: Negative for ear discharge, ear pain, rhinorrhea, sore throat and trouble swallowing. Eyes: Negative for discharge and redness. Respiratory: Negative for cough, shortness of breath and wheezing. Cardiovascular: Negative for chest pain and leg swelling. Gastrointestinal: Negative for abdominal pain, constipation, diarrhea and nausea. Endocrine: Negative for polyuria. Genitourinary: Negative for dysuria, flank pain, frequency, hematuria and urgency. Musculoskeletal: Negative for back pain and myalgias. Skin: Negative for rash. Neurological: Negative for dizziness, seizures and headaches. Hematological: Does not bruise/bleed easily. Psychiatric/Behavioral: Negative for hallucinations and suicidal ideas. All other systems reviewed and are negative. Past Medical History: All past medical history reviewed today. Past Medical History:   Diagnosis Date    Diabetes mellitus ([facility] [address]        Past Surgical History: All past surgical history was reviewed today. Past Surgical History:   Procedure Laterality Date    CHOLECYSTECTOMY      CYSTOSCOPY Right 2/25/2022    CYSTOSCOPY, RIGHT RETROGRADE PYELOGRAM, PLACEMENT OF RIGHT URETERAL STENT performed by Mardel Closs, MD at [address] [place] [address],[facility] Floor      gastric sleeve    HERNIA REPAIR      PANCREAS SURGERY         Family History: All family history was reviewed today.         Problem Relation Age of Onset    High Blood Pressure Mother     Diabetes Father Objective:       PHYSICAL EXAM:      Vitals:   Vitals:    02/27/22 0014 02/27/22 0330 02/27/22 0657 02/27/22 1115   BP: 130/86 121/73 119/75 119/77   Pulse: 90 86 83 89   Resp: 18 18 16 16   Temp: 97.4 °F (36.3 °C) 97.4 °F (36.3 °C) 97.1 °F (36.2 °C) 96.8 °F (36 °C)   TempSrc: Temporal Temporal Temporal Temporal   SpO2: 95% 93% 91% 93%   Weight:    260 lb 2.3 oz (118 kg)   Height:    5' 11\" (1.803 m)       Physical Exam  Vitals and nursing note reviewed. Constitutional:       Appearance: Normal appearance. He is well-developed. HENT:      Head: Normocephalic and atraumatic. Right Ear: External ear normal.      Left Ear: External ear normal.      Nose: Nose normal. No congestion or rhinorrhea. Mouth/Throat:      Mouth: Mucous membranes are moist.      Pharynx: No oropharyngeal exudate or posterior oropharyngeal erythema. Eyes:      General: No scleral icterus. Right eye: No discharge. Left eye: No discharge. Conjunctiva/sclera: Conjunctivae normal.      Pupils: Pupils are equal, round, and reactive to light. Cardiovascular:      Rate and Rhythm: Normal rate and regular rhythm. Pulses: Normal pulses. Heart sounds: No murmur heard. No friction rub. Pulmonary:      Effort: Pulmonary effort is normal. No respiratory distress. Breath sounds: Normal breath sounds. No stridor. No wheezing, rhonchi or rales. Abdominal:      General: Bowel sounds are normal.      Palpations: Abdomen is soft. Tenderness: There is no abdominal tenderness. There is no right CVA tenderness, left CVA tenderness, guarding or rebound. Musculoskeletal:         General: No swelling or tenderness. Normal range of motion. Cervical back: Normal range of motion and neck supple. No rigidity. No muscular tenderness. Lymphadenopathy:      Cervical: No cervical adenopathy. Skin:     General: Skin is warm and dry. Coloration: Skin is not jaundiced.       Findings: No erythema or rash. Neurological:      General: No focal deficit present. Mental Status: He is alert and oriented to person, place, and time. Mental status is at baseline. Motor: No abnormal muscle tone. Psychiatric:         Mood and Affect: Mood normal.         Behavior: Behavior normal.         Thought Content: Thought content normal.           Lines and drains: All vascular access sites are healthy with no local erythema, discharge or tenderness. Intake and output:    I/O last 3 completed shifts: In: 80 [P.O.:780]  Out: 3205 [Urine:3205]    Lab Data:   All available labs and old records have been reviewed by me. CBC:  Recent Labs     02/25/22  1045 02/26/22  0528 02/27/22  0509   WBC 22.5* 15.0* 9.4   RBC 5.27 4.50 4.59   HGB 15.7 13.6 14.0   HCT 46.1 39.8* 40.5    255 282   MCV 87.4 88.5 88.3   MCH 29.8 30.3 30.5   MCHC 34.1 34.3 34.5   RDW 12.7 12.8 12.7        BMP:  Recent Labs     02/25/22  1045 02/26/22  0528 02/27/22  0509   * 133* 136   K 3.8 3.8 4.3   CL 96* 99 101   CO2 22 20* 24   BUN 14 17 16   CREATININE 1.0 0.8* 0.8*   CALCIUM 9.5 8.5 9.0   GLUCOSE 285* 246* 146*        Hepatic Function Panel:   Lab Results   Component Value Date    ALKPHOS 92 02/27/2022    ALT 13 02/27/2022    AST 17 02/27/2022    PROT 6.8 02/27/2022    BILITOT 0.5 02/27/2022    LABALBU 3.4 02/27/2022       CPK:   Lab Results   Component Value Date    CKTOTAL 363 (H) 05/08/2017     ESR: No results found for: SEDRATE  CRP: No results found for: CRP        Imaging: All pertinent images and reports for the current visit were reviewed by me during this visit. I reviewed the chest x-ray/CT scan/MRI images and independently interpreted the findings and results today. FL RETROGRADE PYELOGRAM RIGHT   Final Result   Intraprocedural fluoroscopic spot images as above. See separate procedure   report for more information.          CT ABDOMEN PELVIS WO CONTRAST Additional Contrast? None   Final Result   Mild Infectious Disease   [address] [place] [address], [address] [facility], [address] [place] [address]  Office: [phone]  Fax: [phone]  Clinic days:  Tuesday & Thursday

## 2022-02-27 NOTE — PROGRESS NOTES
Urology Progress Note  [facility]    Provider: Mardel Closs, MD MD Patient ID:  Admission Date: 2022 Name: Rosellen Bence  OR Date: 2022 MRN: [id number]   Patient Location: [id number]/[id number] : 1960  Attending: Chadwick Styles MD Date of Service: 2022  PCP: No primary care provider on file. Diagnoses:  1. Ureterolithiasis       Ureteral stone   Urinary tract infection     Assessment/Plan:  64 yo M with a right 8 mm proximal ureteral stone , urine with bacteriuria, leukocystosis, tachycardic and febrile. First stone episode. Blood cuture +. Now s/p RIGHT ureteral stent placement . HR improved. UCx still pending. Strep in blood. ID workup with echo and repeating blood cultures         - continue stent, flomax, pain control  - will need definitive stone treatment after infection cleared- outpt fu requested  - UCX empiric abx, control BG   - ID for bacteremia     The patient had a chance to ask questions which were answered. He understands the above plan.     Subjective:   Rosellen Bence is a 63 y.o. male. He was seen and examined this morning.  Feels great no more fevers and pain controlled     Objective:   Vitals:  Vitals:    22 0657   BP: 119/75   Pulse: 83   Resp: 16   Temp: 97.1 °F (36.2 °C)   SpO2: 91%       Intake/Output Summary (Last 24 hours) at 2022 0930  Last data filed at 2022 0640  Gross per 24 hour   Intake 240 ml   Output 2425 ml   Net -2185 ml     Physical Exam:  Gen: Alert and oriented x3, no acute distress    Labs:  Lab Results   Component Value Date    WBC 9.4 2022    HGB 14.0 2022    HCT 40.5 2022    MCV 88.3 2022     2022     Lab Results   Component Value Date    CREATININE 0.8 (L) 2022    BUN 16 2022     2022    K 4.3 2022     2022    CO2 24 2022       Mardel Closs, MD MD  2022

## 2022-02-28 VITALS
TEMPERATURE: 96.6 F | HEIGHT: 71 IN | HEART RATE: 94 BPM | OXYGEN SATURATION: 97 % | SYSTOLIC BLOOD PRESSURE: 121 MMHG | BODY MASS INDEX: 36.6 KG/M2 | RESPIRATION RATE: 18 BRPM | WEIGHT: 261.4 LBS | DIASTOLIC BLOOD PRESSURE: 83 MMHG

## 2022-02-28 LAB
A/G RATIO: 1 (ref 1.1–2.2)
ALBUMIN SERPL-MCNC: 3.1 G/DL (ref 3.4–5)
ALP BLD-CCNC: 92 U/L (ref 40–129)
ALT SERPL-CCNC: 16 U/L (ref 10–40)
ANION GAP SERPL CALCULATED.3IONS-SCNC: 12 MMOL/L (ref 3–16)
AST SERPL-CCNC: 21 U/L (ref 15–37)
BASOPHILS ABSOLUTE: 0.1 K/UL (ref 0–0.2)
BASOPHILS RELATIVE PERCENT: 1.1 %
BILIRUB SERPL-MCNC: 0.4 MG/DL (ref 0–1)
BLOOD CULTURE, ROUTINE: ABNORMAL
BLOOD CULTURE, ROUTINE: ABNORMAL
BUN BLDV-MCNC: 14 MG/DL (ref 7–20)
C-REACTIVE PROTEIN: 69.1 MG/L (ref 0–5.1)
CALCIUM SERPL-MCNC: 9 MG/DL (ref 8.3–10.6)
CHLORIDE BLD-SCNC: 102 MMOL/L (ref 99–110)
CO2: 23 MMOL/L (ref 21–32)
CREAT SERPL-MCNC: 0.8 MG/DL (ref 0.9–1.3)
CULTURE, BLOOD 2: ABNORMAL
EOSINOPHILS ABSOLUTE: 0.4 K/UL (ref 0–0.6)
EOSINOPHILS RELATIVE PERCENT: 4.8 %
GFR AFRICAN AMERICAN: >60
GFR NON-AFRICAN AMERICAN: >60
GLUCOSE BLD-MCNC: 123 MG/DL (ref 70–99)
GLUCOSE BLD-MCNC: 128 MG/DL (ref 70–99)
GLUCOSE BLD-MCNC: 168 MG/DL (ref 70–99)
GLUCOSE BLD-MCNC: 173 MG/DL (ref 70–99)
HCT VFR BLD CALC: 39.9 % (ref 40.5–52.5)
HEMOGLOBIN: 13.4 G/DL (ref 13.5–17.5)
LV EF: 53 %
LVEF MODALITY: NORMAL
LYMPHOCYTES ABSOLUTE: 2.4 K/UL (ref 1–5.1)
LYMPHOCYTES RELATIVE PERCENT: 28.5 %
MCH RBC QN AUTO: 29.6 PG (ref 26–34)
MCHC RBC AUTO-ENTMCNC: 33.6 G/DL (ref 31–36)
MCV RBC AUTO: 88.1 FL (ref 80–100)
MONOCYTES ABSOLUTE: 1 K/UL (ref 0–1.3)
MONOCYTES RELATIVE PERCENT: 11.9 %
NEUTROPHILS ABSOLUTE: 4.6 K/UL (ref 1.7–7.7)
NEUTROPHILS RELATIVE PERCENT: 53.7 %
ORGANISM: ABNORMAL
PDW BLD-RTO: 12.9 % (ref 12.4–15.4)
PERFORMED ON: ABNORMAL
PLATELET # BLD: 332 K/UL (ref 135–450)
PMV BLD AUTO: 7.4 FL (ref 5–10.5)
POTASSIUM REFLEX MAGNESIUM: 4.1 MMOL/L (ref 3.5–5.1)
RBC # BLD: 4.53 M/UL (ref 4.2–5.9)
SEDIMENTATION RATE, ERYTHROCYTE: 61 MM/HR (ref 0–20)
SODIUM BLD-SCNC: 137 MMOL/L (ref 136–145)
TOTAL PROTEIN: 6.3 G/DL (ref 6.4–8.2)
WBC # BLD: 8.5 K/UL (ref 4–11)

## 2022-02-28 PROCEDURE — 86140 C-REACTIVE PROTEIN: CPT

## 2022-02-28 PROCEDURE — 2580000003 HC RX 258: Performed by: INTERNAL MEDICINE

## 2022-02-28 PROCEDURE — 99233 SBSQ HOSP IP/OBS HIGH 50: CPT | Performed by: INTERNAL MEDICINE

## 2022-02-28 PROCEDURE — 6370000000 HC RX 637 (ALT 250 FOR IP): Performed by: UROLOGY

## 2022-02-28 PROCEDURE — 85025 COMPLETE CBC W/AUTO DIFF WBC: CPT

## 2022-02-28 PROCEDURE — 36415 COLL VENOUS BLD VENIPUNCTURE: CPT

## 2022-02-28 PROCEDURE — 93306 TTE W/DOPPLER COMPLETE: CPT

## 2022-02-28 PROCEDURE — 99223 1ST HOSP IP/OBS HIGH 75: CPT | Performed by: INTERNAL MEDICINE

## 2022-02-28 PROCEDURE — 6360000002 HC RX W HCPCS: Performed by: INTERNAL MEDICINE

## 2022-02-28 PROCEDURE — 94761 N-INVAS EAR/PLS OXIMETRY MLT: CPT

## 2022-02-28 PROCEDURE — 85652 RBC SED RATE AUTOMATED: CPT

## 2022-02-28 PROCEDURE — 80053 COMPREHEN METABOLIC PANEL: CPT

## 2022-02-28 PROCEDURE — 2580000003 HC RX 258: Performed by: UROLOGY

## 2022-02-28 RX ORDER — LANCETS 30 GAUGE
1 EACH MISCELLANEOUS
Qty: 100 EACH | Refills: 3 | Status: SHIPPED | OUTPATIENT
Start: 2022-02-28

## 2022-02-28 RX ORDER — TAMSULOSIN HYDROCHLORIDE 0.4 MG/1
0.4 CAPSULE ORAL DAILY
Qty: 30 CAPSULE | Refills: 3 | Status: SHIPPED | OUTPATIENT
Start: 2022-03-01 | End: 2022-03-28

## 2022-02-28 RX ORDER — GLUCOSAMINE HCL/CHONDROITIN SU 500-400 MG
CAPSULE ORAL
Qty: 100 STRIP | Refills: 3 | Status: SHIPPED | OUTPATIENT
Start: 2022-02-28

## 2022-02-28 RX ORDER — AMOXICILLIN 500 MG/1
500 CAPSULE ORAL 4 TIMES DAILY
Qty: 40 CAPSULE | Refills: 0 | Status: SHIPPED | OUTPATIENT
Start: 2022-02-28 | End: 2022-03-10

## 2022-02-28 RX ADMIN — CEFTRIAXONE 2000 MG: 2 INJECTION, POWDER, FOR SOLUTION INTRAMUSCULAR; INTRAVENOUS at 13:07

## 2022-02-28 RX ADMIN — INSULIN LISPRO 7 UNITS: 100 INJECTION, SOLUTION INTRAVENOUS; SUBCUTANEOUS at 12:16

## 2022-02-28 RX ADMIN — INSULIN GLARGINE 20 UNITS: 100 INJECTION, SOLUTION SUBCUTANEOUS at 10:08

## 2022-02-28 RX ADMIN — Medication 10 ML: at 10:18

## 2022-02-28 RX ADMIN — INSULIN LISPRO 2 UNITS: 100 INJECTION, SOLUTION INTRAVENOUS; SUBCUTANEOUS at 17:11

## 2022-02-28 RX ADMIN — INSULIN LISPRO 7 UNITS: 100 INJECTION, SOLUTION INTRAVENOUS; SUBCUTANEOUS at 10:05

## 2022-02-28 RX ADMIN — INSULIN LISPRO 2 UNITS: 100 INJECTION, SOLUTION INTRAVENOUS; SUBCUTANEOUS at 12:17

## 2022-02-28 RX ADMIN — TAMSULOSIN HYDROCHLORIDE 0.4 MG: 0.4 CAPSULE ORAL at 10:15

## 2022-02-28 RX ADMIN — INSULIN LISPRO 7 UNITS: 100 INJECTION, SOLUTION INTRAVENOUS; SUBCUTANEOUS at 17:11

## 2022-02-28 ASSESSMENT — ENCOUNTER SYMPTOMS
WHEEZING: 0
CONSTIPATION: 0
DIARRHEA: 0
EYE DISCHARGE: 0
NAUSEA: 0
SORE THROAT: 0
EYE REDNESS: 0
SHORTNESS OF BREATH: 0
ABDOMINAL PAIN: 0
COUGH: 0
TROUBLE SWALLOWING: 0
BACK PAIN: 0
RHINORRHEA: 0

## 2022-02-28 ASSESSMENT — PAIN SCALES - GENERAL
PAINLEVEL_OUTOF10: 0

## 2022-02-28 NOTE — PROGRESS NOTES
Glucose meter delivered by outpatient pharmacy. Meter programmed. Demonstrated lancet & meter use. the patient able to do return demonstration with only minimal verbal cuing needed. Richy Bronson RN, BSN, [facility].

## 2022-02-28 NOTE — DISCHARGE INSTR - COC
Continuity of Care Form    Patient Name: Howard Guerra   :  1960  MRN:  [id number]    Admit date:  2022  Discharge date:  ***    Code Status Order: Full Code   Advance Directives:      Admitting Physician:  Caitlin Collado MD  PCP: No primary care provider on file.     Discharging Nurse: [facility] Unit/Room#: [id number]  Discharging Unit Phone Number: ***    Emergency Contact:   Extended Emergency Contact Information  Primary Emergency Contact: Ally Campos 6 Phone: [phone]  Mobile Phone: [phone]  Relation: Employer  Secondary Emergency Contact: Jose Dai  Home Phone: [phone]  Relation: Niece/Nephew    Past Surgical History:  Past Surgical History:   Procedure Laterality Date    CHOLECYSTECTOMY      CYSTOSCOPY Right 2022    CYSTOSCOPY, RIGHT RETROGRADE PYELOGRAM, PLACEMENT OF RIGHT URETERAL STENT [facility] [address] [facility] [address]      gastric sleeve    HERNIA REPAIR      PANCREAS SURGERY         Immunization History:   Immunization History   Administered Date(s) Administered    COVID-19, Pfizer Purple top, DILUTE for use, 12+ yrs, 30mcg/0.3mL dose 2021, 2021       Active Problems:  Patient Active Problem List   Diagnosis Code    SUGAR (acute kidney injury) ([facility] [address] N17.9    Rhabdomyolysis M62.82    Sepsis ([facility] [address] A41.9    Ureterolithiasis N20.1    Nausea and vomiting R11.2    Hydronephrosis, right N13.30    Class 2 obesity due to excess calories with body mass index (BMI) of 38.0 to 38.9 in adult E66.09, Z68.38    Diabetes mellitus type 2 in obese ([facility] [address] E11.69, E66.9    Weight loss counseling, encounter for Z71.3       Isolation/Infection:   Isolation            No Isolation          Patient Infection Status       None to display            Nurse Assessment:  Last Vital Signs: /77   Pulse 80   Temp 96.7 °F (35.9 °C)   Resp 18   Ht 5' 11\" (1.803 m)   Wt 261 lb 6.4 oz (118.6 kg)   SpO2 98%   BMI 36.46 kg/m² Last documented pain score (0-10 scale): Pain Level: 0  Last Weight:   Wt Readings from Last 1 Encounters:   22 261 lb 6.4 oz (118.6 kg)     Mental Status:  {IP PT MENTAL STATUS:}    IV Access:  [employer] IV ACCESS:444296975}    Nursing Mobility/ADLs:  Walking   {CHP DME IIRW:428245294}  Transfer  {CHP DME JIFC:522440132}  Bathing  {CHP DME PNI}  Dressing  {CHP DME ZDMF:698372424}  Toileting  {CHP DME BSLE:141425519}  Feeding  {CHP DME RWAH:544572826}  Med Admin  {CHP DME GSPX:970848894}  Med Delivery   {[facility] MED Delivery:813287814}    Wound Care Documentation and Therapy:        Elimination:  Continence: Bowel: {YES / VO:50533}  Bladder: {YES / DC:82164}  Urinary Catheter: {Urinary Catheter:240828774}   Colostomy/Ileostomy/Ileal Conduit: {YES / MQ:06945}       Date of Last BM: ***    Intake/Output Summary (Last 24 hours) at 2022 1401  Last data filed at 2022 1038  Gross per 24 hour   Intake 540 ml   Output 1575 ml   Net -1035 ml     I/O last 3 completed shifts:   In: 240 [P.O.:240]  Out: 2625 [Urine:2625]    Safety Concerns:     [employer] Safety Concerns:465587644}    Impairments/Disabilities:      [employer] Impairments/Disabilities:680877675}    Nutrition Therapy:  Current Nutrition Therapy:   [employer] Diet List:171464798}    Routes of Feeding: {CHP DME Other Feedings:712099286}  Liquids: {Slp liquid thickness:23790}  Daily Fluid Restriction: {CHP DME Yes amt example:930458137}  Last Modified Barium Swallow with Video (Video Swallowing Test): {Done Not Done WACB:096780346}    Treatments at the Time of Hospital Discharge:   Respiratory Treatments: ***  Oxygen Therapy:  {Therapy; copd oxygen:25361}  Ventilator:    { CC Vent EWLF:797815596}    Rehab Therapies: {THERAPEUTIC INTERVENTION:3437506320}  Weight Bearing Status/Restrictions: Lamar SANCHEZ Weight Bearin}  Other Medical Equipment (for information only, NOT a DME order):  {EQUIPMENT:578623032}  Other Treatments: ***    Patient's personal belongings (please select all that are sent with patient):  {CHP DME Belongings:081139658}    RN SIGNATURE:  {Esignature:899631074}    CASE MANAGEMENT/SOCIAL WORK SECTION    Inpatient Status Date: ***    Readmission Risk Assessment Score:  Readmission Risk              Risk of Unplanned Readmission:  9           Discharging to Facility/ Agency   Name: José Greer reported patient's current  IVABX is covered at 100%. Referred to [facility]  Address:  Phone:  Fax:    Dialysis Facility (if applicable)   Name:  Address:  Dialysis Schedule:  Phone:  Fax:    / signature: Electronically signed by COURTNEY Schmidt on 2/28/22 at 2:00 PM EST    PHYSICIAN SECTION    Prognosis: {Prognosis:5386351596}    Condition at Discharge: [address] [place] [address] Patient Condition:618428234}    Rehab Potential (if transferring to Rehab): {Prognosis:6810200866}    Recommended Labs or Other Treatments After Discharge: ***    Physician Certification: I certify the above information and transfer of [facility]  is necessary for the continuing treatment of the diagnosis listed and that he requires {Admit to Appropriate Level of Care:92858} for {GREATER/LESS:179865428} 30 days.      Update Admission H&P: {CHP DME Changes in FMRRF:371548444}    PHYSICIAN SIGNATURE:  {Esignature:519515571}

## 2022-02-28 NOTE — PROGRESS NOTES
Infectious Diseases   Progress Note      Admission Date: 2/25/2022  Hospital Day: Hospital Day: 4   Attending: Margi Pineda MD  Date of service: 2/28/2022     Chief complaint/ Reason for consult:     · Sepsis on admission with high fever, leukocytosis  · Group B streptococcus bacteremia  · Nausea and vomiting  · Right-sided hydronephrosis with the partially obstructing proximal right ureteral stone on CT scan which was retrograde pyelography and cystoscopy with right ureteral stent placement on 2/25/2022    Microbiology:        I have reviewed allavailable micro lab data and cultures    · Blood culture (2/2) - collected on 2/25/2022: Group B streptococcus    · Urine culture  - collected on 2/25/2022: Greater than 100,000 CFU per mL of group B streptococcus      Antibiotics and immunizations:       Current antibiotics: All antibiotics and their doses were reviewed by me    Recent Abx Admin      No antibiotic orders with administrations found. Immunization History: All immunization history was reviewed by me today. Immunization History   Administered Date(s) Administered    COVID-19, Pfizer Purple top, DILUTE for use, 12+ yrs, 30mcg/0.3mL dose 03/18/2021, 04/08/2021       Known drug allergies: All allergies were reviewed and updated    Allergies   Allergen Reactions    Dye [Gadolinium Derivatives]      From past MRI       Social history:     Social History:  All social andepidemiologic history was reviewed and updated by me today as needed. · Tobacco use:   reports that he has never smoked. He does not have any smokeless tobacco history on file. · Alcohol use:   reports no history of alcohol use. · Currently lives in: [facility]  ·  reports no history of drug use.      COVID VACCINATION AND LAB RESULT RECORDS:     Internal Administration   First Dose COVID-19, Pfizer Purple top, DILUTE for use, 12+ yrs, 30mcg/0.3mL dose  03/18/2021   Second Dose COVID-19, [email or web address] antibiotic toxicity as follows: CBC, CMP   · Continue to watch for following: new or worsening fever, new hypotension, hives, lip swelling and redness or purulence at vascular access sites. I/v access Management:    · Continue to monitor i.v access sites for erythema, induration, discharge or tenderness. · As always, continue efforts to minimize tubes/lines/drains as clinically appropriate to reduce chances of line associated infections. Patient education and counseling:        · The patient was educated in detail about the side-effects of various antibiotics and things to watch for like new rashes, lip swelling, severe reaction, worsening diarrhea, break through fever etc.  · Discussed patient's condition and what to expect. All of the patient's questions were addressed in a satisfactory manner and patient verbalized understanding all instructions. Level of complexity of visit: High     Weight loss counseling:    Extensive weight loss counseling was done. It is important to set a realistic weight loss goal. First goal should be to avoid gaining more weight and staying at current weight (or within 5 percent). People at high risk of developing diabetes who are able to lose 5 percent of their body weight and maintain this weight will reduce their risk of developing diabetes by about 50 percent and reduce their blood pressure. Losing more than 15 percent of  body weight and staying at this weight is an extremely good result, even if you never reach your \"dream\" or \"ideal\" weight. Lifestyle changes including changing eating habits, substituting excess carbohydrates with proteins, stress reduction, using self-help programs like Weight Watchers®, Overeaters Anonymous®, and Take Off Pounds Sensibly (TOPS)© , following DASH diet and increasing exercise or walking briskly daily for half hour to and hour 5-7 days a week was suggested among other measures.  Information was given about various weight loss education programs and their websites like www.cdc.gov/healthyweight, www.choosemyplate.gov and www.health.gov/dietaryguidelines/      TIME SPENT TODAY:     - Spent over  36 minutes on visit (including interval history, physical exam, review of data including labs, cultures, imaging, development and implementation of treatment plan and coordination of complex care). More than 50 percent of this includes face-to-face time spent with the patient for counseling and coordination of care. Thank you for involving me in the care of your patient. I will continue to follow. If you have anyadditional questions, please do not hesitate to contact me. Subjective: Interval history: Interval history was obtained from chart review and patient/ RN. He is afebrile. He is tolerating antibiotics okay. No diarrhea     REVIEW OF SYSTEMS:      Review of Systems   Constitutional: Negative for chills, diaphoresis and fever. HENT: Negative for ear discharge, ear pain, rhinorrhea, sore throat and trouble swallowing. Eyes: Negative for discharge and redness. Respiratory: Negative for cough, shortness of breath and wheezing. Cardiovascular: Negative for chest pain and leg swelling. Gastrointestinal: Negative for abdominal pain, constipation, diarrhea and nausea. Endocrine: Negative for polyuria. Genitourinary: Negative for dysuria, flank pain, frequency, hematuria and urgency. Musculoskeletal: Negative for back pain and myalgias. Skin: Negative for rash. Neurological: Negative for dizziness, seizures and headaches. Hematological: Does not bruise/bleed easily. Psychiatric/Behavioral: Negative for hallucinations and suicidal ideas. All other systems reviewed and are negative. Past Medical History: All past medical history reviewed today. Past Medical History:   Diagnosis Date    Diabetes mellitus ([facility] [address]        Past Surgical History: All past surgical history was reviewed today.     Past Surgical History:   Procedure Laterality Date    CHOLECYSTECTOMY      CYSTOSCOPY Right 2/25/2022    CYSTOSCOPY, RIGHT RETROGRADE PYELOGRAM, PLACEMENT OF RIGHT URETERAL STENT performed by Elías James MD at [address] [place] [address],[facility] Floor      gastric sleeve    HERNIA REPAIR      PANCREAS SURGERY         Family History: All family history was reviewed today. Problem Relation Age of Onset    High Blood Pressure Mother     Diabetes Father        Objective:       PHYSICAL EXAM:      Vitals:   Vitals:    02/28/22 0723 02/28/22 0902 02/28/22 1024 02/28/22 1030   BP:    120/77   Pulse:   80 80   Resp:  16  18   Temp:    96.7 °F (35.9 °C)   TempSrc:       SpO2:  96%  98%   Weight: 261 lb 6.4 oz (118.6 kg)      Height:           Physical Exam  Vitals and nursing note reviewed. Constitutional:       Appearance: Normal appearance. He is well-developed. HENT:      Head: Normocephalic and atraumatic. Right Ear: External ear normal.      Left Ear: External ear normal.      Nose: Nose normal. No congestion or rhinorrhea. Mouth/Throat:      Mouth: Mucous membranes are moist.      Pharynx: No oropharyngeal exudate or posterior oropharyngeal erythema. Eyes:      General: No scleral icterus. Right eye: No discharge. Left eye: No discharge. Conjunctiva/sclera: Conjunctivae normal.      Pupils: Pupils are equal, round, and reactive to light. Cardiovascular:      Rate and Rhythm: Normal rate and regular rhythm. Pulses: Normal pulses. Heart sounds: No murmur heard. No friction rub. Pulmonary:      Effort: Pulmonary effort is normal. No respiratory distress. Breath sounds: Normal breath sounds. No stridor. No wheezing, rhonchi or rales. Abdominal:      General: Bowel sounds are normal.      Palpations: Abdomen is soft. Tenderness: There is no abdominal tenderness. There is no right CVA tenderness, left CVA tenderness, guarding or rebound.    Musculoskeletal: General: No swelling or tenderness. Normal range of motion. Cervical back: Normal range of motion and neck supple. No rigidity. No muscular tenderness. Lymphadenopathy:      Cervical: No cervical adenopathy. Skin:     General: Skin is warm and dry. Coloration: Skin is not jaundiced. Findings: No erythema or rash. Neurological:      General: No focal deficit present. Mental Status: He is alert and oriented to person, place, and time. Mental status is at baseline. Motor: No abnormal muscle tone. Psychiatric:         Mood and Affect: Mood normal.         Behavior: Behavior normal.         Thought Content: Thought content normal.           *    Lines and drains: All vascular access sites are healthy with no local erythema, discharge or tenderness. Intake and output:    I/O last 3 completed shifts: In: 240 [P.O.:240]  Out: 2625 [Urine:2625]    Lab Data:   All available labs and old records have been reviewed by me. CBC:  Recent Labs     02/26/22  0528 02/27/22  0509 02/28/22  0454   WBC 15.0* 9.4 8.5   RBC 4.50 4.59 4.53   HGB 13.6 14.0 13.4*   HCT 39.8* 40.5 39.9*    282 332   MCV 88.5 88.3 88.1   MCH 30.3 30.5 29.6   MCHC 34.3 34.5 33.6   RDW 12.8 12.7 12.9        BMP:  Recent Labs     02/26/22  0528 02/27/22  0509 02/28/22  0454   * 136 137   K 3.8 4.3 4.1   CL 99 101 102   CO2 20* 24 23   BUN 17 16 14   CREATININE 0.8* 0.8* 0.8*   CALCIUM 8.5 9.0 9.0   GLUCOSE 246* 146* 128*        Hepatic Function Panel:   Lab Results   Component Value Date    ALKPHOS 92 02/28/2022    ALT 16 02/28/2022    AST 21 02/28/2022    PROT 6.3 02/28/2022    BILITOT 0.4 02/28/2022    LABALBU 3.1 02/28/2022       CPK:   Lab Results   Component Value Date    CKTOTAL 363 (H) 05/08/2017     ESR: No results found for: SEDRATE  CRP: No results found for: CRP        Imaging: All pertinent images and reports for the current visit were reviewed by me during this visit.   I reviewed the chest x-ray/CT scan/MRI images and independently interpreted the findings and results today. FL RETROGRADE PYELOGRAM RIGHT   Final Result   Intraprocedural fluoroscopic spot images as above. See separate procedure   report for more information. CT ABDOMEN PELVIS WO CONTRAST Additional Contrast? None   Final Result   Mild right hydronephrosis secondary to partially obstructing proximal   ureteral stone. RECOMMENDATIONS:   Unavailable             Medications: All current and past medications were reviewed.  cefTRIAXone (ROCEPHIN) IV  2,000 mg IntraVENous Q24H    insulin glargine  20 Units SubCUTAneous Daily    insulin lispro  7 Units SubCUTAneous TID WC    sodium chloride flush  5-40 mL IntraVENous 2 times per day    enoxaparin  40 mg SubCUTAneous Daily    insulin lispro  0-12 Units SubCUTAneous TID WC    insulin lispro  0-6 Units SubCUTAneous Nightly    tamsulosin  0.4 mg Oral Daily        sodium chloride      dextrose         sodium chloride flush, sodium chloride, ondansetron **OR** ondansetron, polyethylene glycol, acetaminophen **OR** acetaminophen, morphine, glucose, glucagon (rDNA), dextrose, dextrose bolus (hypoglycemia) **OR** dextrose bolus (hypoglycemia)      Problem list:       Patient Active Problem List   Diagnosis Code    SUGAR (acute kidney injury) ([facility] [address] N17.9    Rhabdomyolysis M62.82    Sepsis ([facility] [address] A41.9    Ureterolithiasis N20.1    Nausea and vomiting R11.2    Hydronephrosis, right N13.30    Class 2 obesity due to excess calories with body mass index (BMI) of 38.0 to 38.9 in adult E66.09, Z68.38    Diabetes mellitus type 2 in obese ([facility] [address] E11.69, E66.9    Diabetes education, encounter for Z71.89       Please note that this chart was generated using Dragon dictation software. Although every effort was made to ensure the accuracy of this automated transcription, some errors in transcription may have occurred inadvertently.  If you may need any clarification, please do not hesitate to contact me through EPIC or at the phone number provided below with my electronic signature. Any pictures or media included in this note were obtained after taking informed verbal consent from the patient and with their approval to include those in the patient's medical record.       Sari Mckinley MD, MPH, FACP, [facility]  2/28/2022, 11:14 AM  [facility] Infectious Disease   [address] [place] [address], [address] [place] [address]  Office: [phone]  Fax: [phone]  Clinic days:  Tuesday & Thursday

## 2022-02-28 NOTE — CONSULTS
Cardiology consult   Patient seen and examined, chart reviewed   Full note to follow     Patient with GBS bacteremia that has cleared  Had an obstructing kidney stone s/p stent that was likely source of infection  No fever, right flank pain almost resolved (only hurts during urination)    He is hemodynamically stable  His acoustic windows were poor d/t body habitus, however LV/RV function are normal and valve function by Doppler is normal. Unable to clearly visualize valve leaflets. Likelihood that he has I.E. is fairly low. Only reason at this time that a DANIELA is needed is to determine length of antibiotic therapy. I would be happy to see him in my office in the next 1-2 weeks to do a DANIELA or other assessment as needed to determine length of antibiotic. From a cardiology standpoint, he is hemodynamically stable, has no murmur, no evidence of valve regurgitation on echo, no indication for heart surgery, and is stable to go home now provided that he has access to enough adequate antibiotics until he can be reevaluate in cardiology office as an outpatient.

## 2022-02-28 NOTE — CARE COORDINATION
Gypsy from [employer] reported patient's current  IVABX is covered at 100%. Referred to [facility], pending a home care agency. Please complete & sign the OSMAN/AVS/Prescriptions,  RN please print OSMAN/AVS once completed.  Thank you, Tae Her, GEORGE, [phone]

## 2022-02-28 NOTE — PROGRESS NOTES
CLINICAL PHARMACY NOTE: MEDS TO BEDS    Total # of Prescriptions Filled: 4   The following medications were delivered to the patient:  · Test strips  · Meter  · Alcohol swabs  · Lancets    Additional Documentation:    Delivered to Patient=Signed  Vilma Weeks CPhT

## 2022-02-28 NOTE — PROGRESS NOTES
Reviewed diabetes education, all questions answered. Blood sugar Testing supplies ordered, will return for glucose meter education after delivered by outpt pharmacy. Bashir Fernandes RN, BSN, [facility], [facility].

## 2022-02-28 NOTE — PROGRESS NOTES
Urology Progress Note  [facility]    Provider: CHARLEY Rosa CNP, MD Patient ID:  Admission Date: 2022 Name: Hood Yu  OR Date: 2022 MRN: [id number]   Patient Location: [id number] : 1960  Attending: Marcelina Washington MD Date of Service: 2022  PCP: No primary care provider on file. Diagnoses:  1. Ureterolithiasis       Ureteral stone   Urinary tract infection     Assessment/Plan:  64 yo M with a right 8 mm proximal ureteral stone , urine with bacteriuria, leukocystosis, tachycardic and febrile. First stone episode. Blood cuture +. Now s/p RIGHT ureteral stent placement . HR improved. UCx still pending        - Getting echo today  - continue stent, flomax, pain control  - will need definitive stone treatment after infection cleared- outpt fu requested  - Strep A UTI, continue abx and f/u sensitivity  - Getting echo today. Pending sensitivity, could discharge per  when medically stable. The patient had a chance to ask questions which were answered. He understands the above plan. Subjective:   Hood Yu is a 63 y.o. male. He was seen and examined this morning.  Feels great no more fevers and pain controlled     Objective:   Vitals:  Vitals:    22 0654   BP: 123/79   Pulse: 86   Resp: 18   Temp: 96.7 °F (35.9 °C)   SpO2: 94%       Intake/Output Summary (Last 24 hours) at 2022 0851  Last data filed at 2022 0414  Gross per 24 hour   Intake --   Output 1375 ml   Net -1375 ml       Physical Exam:  Gen: Alert and oriented x3, no acute distress      Labs:  Lab Results   Component Value Date    WBC 8.5 2022    HGB 13.4 (L) 2022    HCT 39.9 (L) 2022    MCV 88.1 2022     2022     Lab Results   Component Value Date    CREATININE 0.8 (L) 2022    BUN 14 2022     2022    K 4.1 2022     2022    CO2 23 2022       CHARLEY Rosa - JASWANT SCHAFER  2022

## 2022-02-28 NOTE — CARE COORDINATION
.Pending ID, RN to offer [facility] teaching prior to discharge, (and an order for an anaphylactic kit if first dose in the home)  and discharge after last dose (via [employer]). Please add IVABX order/dose and STOP date to the [address] [place], [place] [address]s [place] form. Call [employer] . [phone],ESTER [id number].   Caio Man, [phone]   Thank you

## 2022-02-28 NOTE — CONSULTS
[facility]  [phone]      Chief Complaint   Patient presents with    Flank Pain     Right sided, aching flank pain with nausea since Tuesday. Denies difficulty with urination/bowels. Hx of DM. Reason for consult:  Bacteremia, possible endocarditis    History of Present Illness:  Jenny Awan is a 63 y.o. patient who presented to the hospital with complaints of right flank pain. I have been asked to provide consultation regarding further management and testing. Patient had fever and right flank pain, found to have 8 mm partially obstructing right kidney stone, s/p cysto and stent with relief of acute symptoms. Blood cx grew GBS on 2//25 but has already cleared. Because of bacteremia ID was consulted who requested an echo to evaluate for SBE. This didn't show any valve Doppler problems or ventricular function issues, but valves weren't seen well, thus I have been requested to do a DANIELA. Patient states he feels well and is adamant that he is going to go home today. He states \"I know my heart could be infected, but I'm willing to take that risk. \"  I spent a while discussing with him what a DANIELA is and what the rationale is, as well as that the DANIELA is recommended simply because we can't see his valves well and that he was bacteremic. Past Medical History:   has a past medical history of Diabetes mellitus ([address] Surgical History:   has a past surgical history that includes Gastric bypass surgery; Cholecystectomy; hernia repair; Pancreas surgery; and Cystoscopy (Right, 2/25/2022). Social History:   reports that he has never smoked. He does not have any smokeless tobacco history on file. He reports that he does not drink alcohol and does not use drugs. Family History:  No family history of premature coronary artery disease, aortic disease, or valve disease. Home Medications:  Were reviewed and are listed in nursing record.  and/or listed below  Prior to Admission medications    Medication Sig Start Date End Date Taking? Authorizing Provider   blood glucose monitor kit and supplies Dispense sufficient amount for indicated testing frequency plus additional to accommodate PRN testing needs. Dispense all needed supplies to include: monitor, strips, lancing device, lancets, control solutions, alcohol swabs. 2/28/22  Yes Emperatriz Aggarwal MD   blood glucose monitor strips Test 4 times a day & as needed for symptoms of irregular blood glucose. Dispense sufficient amount for indicated testing frequency plus additional to accommodate PRN testing needs.  2/28/22  Yes Emperatriz Aggarwal MD   Lancets MISC 1 each by Does not apply route 4 times daily (before meals and nightly) 2/28/22  Yes Emperatriz Aggarwal MD        Current Medications:  Current Facility-Administered Medications   Medication Dose Route Frequency Provider Last Rate Last Admin    cefTRIAXone (ROCEPHIN) 2000 mg IVPB in D5W 50ml minibag  2,000 mg IntraVENous Q24H Susana Loera MD   Stopped at 02/28/22 1337    insulin glargine (LANTUS;BASAGLAR) injection pen 20 Units  20 Units SubCUTAneous Daily Emperatriz Aggarwal MD   20 Units at 02/28/22 1008    insulin lispro (1 Unit Dial) 7 Units  7 Units SubCUTAneous TID WC Emperatriz Aggarwal MD   7 Units at 02/28/22 1216    sodium chloride flush 0.9 % injection 5-40 mL  5-40 mL IntraVENous 2 times per day Elpidio Mosqueda MD   10 mL at 02/28/22 1018    sodium chloride flush 0.9 % injection 5-40 mL  5-40 mL IntraVENous PRN Elpidio Mosqueda MD   10 mL at 02/26/22 1158    0.9 % sodium chloride infusion  25 mL IntraVENous PRN Elpidio Mosqueda MD        enoxaparin (LOVENOX) injection 40 mg  40 mg SubCUTAneous Daily Elpidio Mosqueda MD   40 mg at 02/27/22 0827    ondansetron (ZOFRAN-ODT) disintegrating tablet 4 mg  4 mg Oral Q8H PRN Elpidio Mosqueda MD        Or    ondansetron [facility]) injection 4 mg  4 mg IntraVENous Q6H PRN Elpidio Mosqueda MD        polyethylene glycol (GLYCOLAX) packet 17 g  17 g Oral Daily PRN Tena Chavarria MD        acetaminophen (TYLENOL) tablet 650 mg  650 mg Oral Q6H PRN Tena Chavarria MD   650 mg at 02/26/22 1614    Or    acetaminophen (TYLENOL) suppository 650 mg  650 mg Rectal Q6H PRN Tena Chavarria MD        insulin lispro (1 Unit Dial) 0-12 Units  0-12 Units SubCUTAneous TID WC Tena Chavarria MD   2 Units at 02/28/22 1217    insulin lispro (1 Unit Dial) 0-6 Units  0-6 Units SubCUTAneous Nightly Tena Chavarria MD   1 Units at 02/26/22 2018    morphine (PF) injection 2 mg  2 mg IntraVENous Q4H PRN Tena Chavarria MD   2 mg at 02/25/22 1338    glucose (GLUTOSE) 40 % oral gel 15 g  15 g Oral PRN Tena Chavarria MD        glucagon (rDNA) injection 1 mg  1 mg IntraMUSCular PRN Tena Chavarria MD        dextrose 5 % solution  100 mL/hr IntraVENous PRN Tena Chavarria MD        dextrose bolus (hypoglycemia) 10% 125 mL  125 mL IntraVENous PRN Tena Chavarria MD        Or    dextrose bolus (hypoglycemia) 10% 250 mL  250 mL IntraVENous PRN Tena Chavarria MD        tamsulosin [facility]) capsule 0.4 mg  0.4 mg Oral Daily Tena Chavarria MD   0.4 mg at 02/28/22 1015        Allergies:  Dye [gadolinium derivatives]     Review of Systems:     · Constitutional: there has been no unanticipated weight loss. There's been no change in energy level, sleep pattern, or activity level. · Eyes: No visual changes or diplopia. No scleral icterus. · ENT: No Headaches, hearing loss or vertigo. No mouth sores or sore throat. · Cardiovascular: no chest pain, no palpitations, no dyspnea  · Respiratory: No cough or wheezing, no sputum production. No hematemesis. · Gastrointestinal: No abdominal pain, appetite loss, blood in stools. No change in bowel or bladder habits. · Genitourinary: see HPI  · Musculoskeletal:  No gait disturbance, weakness or joint complaints. · Integumentary: No rash or pruritis.   · Neurological: No headache, diplopia, change in muscle strength, numbness or tingling. No change in gait, balance, coordination, mood, affect, memory, mentation, behavior. · Psychiatric: No anxiety, no depression. · Endocrine: No malaise, fatigue or temperature intolerance. No excessive thirst, fluid intake, or urination. No tremor. · Hematologic/Lymphatic: No abnormal bruising or bleeding, blood clots or swollen lymph nodes. · Allergic/Immunologic: No nasal congestion or hives.   ·     Physical Examination:    Vitals:    02/28/22 1230   BP: 113/74   Pulse: 76   Resp: 18   Temp: 96.6 °F (35.9 °C)   SpO2: 94%    Weight: 261 lb 6.4 oz (118.6 kg)         General Appearance:  Alert, cooperative, no distress, appears stated age   Head:  Normocephalic, without obvious abnormality, atraumatic   Eyes:  PERRL, conjunctiva/corneas clear       Nose: Nares normal, no drainage or sinus tenderness   Throat: Lips, mucosa, and tongue normal   Neck: Supple, symmetrical, trachea midline, no adenopathy, thyroid: not enlarged, symmetric, no tenderness/mass/nodules, no carotid bruit or JVD       Lungs:   Clear to auscultation bilaterally, respirations unlabored   Chest Wall:  No tenderness or deformity   Heart:  Regular rate and rhythm, S1, S2 normal, no murmur, rub or gallop   Abdomen:   Soft, non-tender, bowel sounds active all four quadrants,  no masses, no organomegaly           Extremities: Extremities normal, atraumatic, no cyanosis or edema   Pulses: 2+ and symmetric   Skin: Skin color, texture, turgor normal, no rashes or lesions   Psych: Normal mood and affect   Neurologic: Normal gross motor and sensory exam.         Labs  No results found for: PROBNP    No results found for: CHOL, TRIG, HDL, LDLCALC, LABVLDL      CBC:   Lab Results   Component Value Date    WBC 8.5 02/28/2022    RBC 4.53 02/28/2022    HGB 13.4 02/28/2022    HCT 39.9 02/28/2022    MCV 88.1 02/28/2022    RDW 12.9 02/28/2022     02/28/2022     CMP:    Lab Results   Component Value Date     02/28/2022 K 4.1 02/28/2022     02/28/2022    CO2 23 02/28/2022    BUN 14 02/28/2022    CREATININE 0.8 02/28/2022    GFRAA >60 02/28/2022    AGRATIO 1.0 02/28/2022    LABGLOM >60 02/28/2022    GLUCOSE 128 02/28/2022    PROT 6.3 02/28/2022    CALCIUM 9.0 02/28/2022    BILITOT 0.4 02/28/2022    ALKPHOS 92 02/28/2022    AST 21 02/28/2022    ALT 16 02/28/2022     PT/INR:  No results found for: PTINR  Lab Results   Component Value Date    VFCRSJQ 236 (H) 05/08/2017     Echo:   Today        Summary   -Normal left ventricle size, wall thickness, and systolic function with an   estimated ejection fraction of 50-55%. -Subtle hypokinesis of inferoseptal wall.   -Normal diastolic function.   -Poor visualization of valve leaflets. Unable to exclude vegetations. Consider DANIELA if clinical suspicion of endocarditis remains. Stress: N/A  Cath: N/A  MRI/EP/Other: N/A    Old notes reviewed  Telemetry reviewd  Ekg personally reviewed  Chest xray personally reviewed  Echo, and   Medications and labs reviewed    High complexity/medical decision making due to extensive data review, extensive history review, independent review of data    High risk due to acute illness, evaluation of drug-drug interactions, medication management and diagnostic interventions    Assessment  1. Group B strep bactermia - PCN sensitive - per ID could potentially go home on oral amoxicillin when ready for discharge    2. Right infected kidney stone s/p ureteral stent    3. Possible IE - very unlikely that he has IE given an alternative cause of infection that already has source control, espeically as cultures are not persistently positive. Even if it is IE, he has no murmur and no evidence of valve dysfunction on echo, with no heart failure and normal ventricular function, it is very unlikely that he would need cardiac surgery.     I discussed with him that it is reasonable if he wants to go home as long as he gets sent home with an adequate amount of antibiotics via ID to treat him until he can come back to my office for a re-evaluation; could consider doing a DANIELA versus repeat surface echo. Will get baseline inflammatory markers; if these are elevated will plan to repeat them as an outpatient. Tobacco use was discussed with the patient and educated on the negative effects. I have asked the patient to not utilize these agents. Thank you for allowing to us to participate in the [facility] or [facility]. Further evaluation will be based upon the patient's clinical course and testing results. All questions and concerns were addressed to the patient/family. Alternatives to my treatment were discussed. The note was completed using EMR. Every effort was made to ensure accuracy; however, inadvertent computerized transcription errors may be present.       Aleksandr Pisano MD, MD 2/28/2022 3:13 PM

## 2022-02-28 NOTE — DISCHARGE SUMMARY
Pt discharged to home. IV and tele removed. All belongings gathered and given to pt. Paperwork explained and also given to pt. Prescriptions called in to pt's pharmacy at [place]. Escorted to vehicle by PCA.

## 2022-03-01 ENCOUNTER — CARE COORDINATION (OUTPATIENT)
Dept: CASE MANAGEMENT | Age: 62
End: 2022-03-01

## 2022-03-01 ENCOUNTER — TELEPHONE (OUTPATIENT)
Dept: CARDIOLOGY CLINIC | Age: 62
End: 2022-03-01

## 2022-03-01 NOTE — TELEPHONE ENCOUNTER
Per MIGUEL's message Pt DC'd from hospital yesterday and he wanted Pt to have a follow up appointment. Appointment made 3/21/2022 @ 9:30.

## 2022-03-01 NOTE — CARE COORDINATION
He 45 Transitions Initial Follow Up Call    Call within 2 business days of discharge: Yes    Patient: Jenny Awan Patient : 1960   MRN: [id number]  Reason for Admission:   Discharge Date: 22 RARS: Readmission Risk Score: 6.2 ( )      Last Discharge [facility]       Complaint Diagnosis Description Type Department Provider    22 Flank Pain Ureterolithiasis ED to Hosp-Ad[facility] (Discharged[facility]; Bernie Wolfe. .. Initial 24 hr call attempted, contact info left on vm.         Follow Up  Future Appointments   Date Time Provider Caio Lorenzo   3/21/2022  9:30 AM Pratibha Gonzalez MD [facility] Cardio LARISSA Downs RN

## 2022-03-02 ENCOUNTER — CARE COORDINATION (OUTPATIENT)
Dept: CASE MANAGEMENT | Age: 62
End: 2022-03-02

## 2022-03-02 LAB
BLOOD CULTURE, ROUTINE: NORMAL
CULTURE, BLOOD 2: NORMAL

## 2022-03-02 NOTE — CARE COORDINATION
He 45 Transitions Initial Follow Up Call    Call within 2 business days of discharge: Yes    Patient: Brian Melendrez Patient : 1960   MRN: [id number]  Reason for Admission:   Discharge Date: 22 RARS: Readmission Risk Score: 6.2 ( )      Last Discharge [facility]       Complaint Diagnosis Description Type Department Provider    22 Flank Pain Ureterolithiasis ED to Hosp-Admission (Discharged) (ADMITTED) [facility] 3T Akua Gonzalez MD; [facility]. ..        2nd and final attempt at an initial 24 hour call, contact info left on .       Follow Up  Future Appointments   Date Time Provider Caio Lorenzo   3/4/2022  2:00 PM Case Pearson MD [facility] [facility] [place]   3/21/2022  9:30 AM Shea Wagner MD [facility] Cardio [facility]       Sunita Philippe RN

## 2022-03-03 LAB — BLOOD CULTURE, ROUTINE: NORMAL

## 2022-03-04 ENCOUNTER — OFFICE VISIT (OUTPATIENT)
Dept: INTERNAL MEDICINE CLINIC | Age: 62
End: 2022-03-04
Payer: COMMERCIAL

## 2022-03-04 VITALS
DIASTOLIC BLOOD PRESSURE: 78 MMHG | HEART RATE: 94 BPM | WEIGHT: 269.6 LBS | BODY MASS INDEX: 37.74 KG/M2 | HEIGHT: 71 IN | SYSTOLIC BLOOD PRESSURE: 134 MMHG

## 2022-03-04 DIAGNOSIS — Z79.4 TYPE 2 DIABETES MELLITUS WITHOUT COMPLICATION, WITH LONG-TERM CURRENT USE OF INSULIN (HCC): Primary | ICD-10-CM

## 2022-03-04 DIAGNOSIS — Z23 NEED FOR 23-POLYVALENT PNEUMOCOCCAL POLYSACCHARIDE VACCINE: ICD-10-CM

## 2022-03-04 DIAGNOSIS — Z98.84 HISTORY OF BARIATRIC SURGERY: ICD-10-CM

## 2022-03-04 DIAGNOSIS — E11.9 TYPE 2 DIABETES MELLITUS WITHOUT COMPLICATION, WITH LONG-TERM CURRENT USE OF INSULIN (HCC): Primary | ICD-10-CM

## 2022-03-04 DIAGNOSIS — E78.2 MIXED HYPERLIPIDEMIA: ICD-10-CM

## 2022-03-04 DIAGNOSIS — N20.1 URETEROLITHIASIS: ICD-10-CM

## 2022-03-04 DIAGNOSIS — Z12.11 COLON CANCER SCREENING: ICD-10-CM

## 2022-03-04 PROBLEM — M62.82 RHABDOMYOLYSIS: Status: RESOLVED | Noted: 2017-05-08 | Resolved: 2022-03-04

## 2022-03-04 PROBLEM — A41.9 SEPSIS (HCC): Status: RESOLVED | Noted: 2022-02-25 | Resolved: 2022-03-04

## 2022-03-04 PROBLEM — N17.9 AKI (ACUTE KIDNEY INJURY) (HCC): Status: RESOLVED | Noted: 2017-05-08 | Resolved: 2022-03-04

## 2022-03-04 PROCEDURE — 90732 PPSV23 VACC 2 YRS+ SUBQ/IM: CPT | Performed by: INTERNAL MEDICINE

## 2022-03-04 PROCEDURE — 99204 OFFICE O/P NEW MOD 45 MIN: CPT | Performed by: INTERNAL MEDICINE

## 2022-03-04 PROCEDURE — 90471 IMMUNIZATION ADMIN: CPT | Performed by: INTERNAL MEDICINE

## 2022-03-04 RX ORDER — LISINOPRIL 2.5 MG/1
2.5 TABLET ORAL DAILY
Qty: 90 TABLET | Refills: 1 | Status: SHIPPED | OUTPATIENT
Start: 2022-03-04 | End: 2022-06-06 | Stop reason: SDUPTHER

## 2022-03-04 RX ORDER — ATORVASTATIN CALCIUM 20 MG/1
20 TABLET, FILM COATED ORAL DAILY
Qty: 90 TABLET | Refills: 1 | Status: SHIPPED | OUTPATIENT
Start: 2022-03-04 | End: 2022-06-06 | Stop reason: SDUPTHER

## 2022-03-04 RX ORDER — BLOOD PRESSURE TEST KIT
KIT MISCELLANEOUS
COMMUNITY
Start: 2022-02-28

## 2022-03-04 SDOH — ECONOMIC STABILITY: FOOD INSECURITY: WITHIN THE PAST 12 MONTHS, THE FOOD YOU BOUGHT JUST DIDN'T LAST AND YOU DIDN'T HAVE MONEY TO GET MORE.: NEVER TRUE

## 2022-03-04 SDOH — ECONOMIC STABILITY: FOOD INSECURITY: WITHIN THE PAST 12 MONTHS, YOU WORRIED THAT YOUR FOOD WOULD RUN OUT BEFORE YOU GOT MONEY TO BUY MORE.: NEVER TRUE

## 2022-03-04 ASSESSMENT — PATIENT HEALTH QUESTIONNAIRE - PHQ9
SUM OF ALL RESPONSES TO PHQ QUESTIONS 1-9: 0
1. LITTLE INTEREST OR PLEASURE IN DOING THINGS: 0
SUM OF ALL RESPONSES TO PHQ QUESTIONS 1-9: 0
SUM OF ALL RESPONSES TO PHQ9 QUESTIONS 1 & 2: 0
2. FEELING DOWN, DEPRESSED OR HOPELESS: 0
SUM OF ALL RESPONSES TO PHQ QUESTIONS 1-9: 0
SUM OF ALL RESPONSES TO PHQ QUESTIONS 1-9: 0

## 2022-03-04 ASSESSMENT — ENCOUNTER SYMPTOMS
NAUSEA: 0
ABDOMINAL PAIN: 0
BACK PAIN: 0
VOMITING: 0
SHORTNESS OF BREATH: 0
COLOR CHANGE: 0
COUGH: 0
CONSTIPATION: 0
SORE THROAT: 0
WHEEZING: 0
CHEST TIGHTNESS: 0

## 2022-03-04 ASSESSMENT — SOCIAL DETERMINANTS OF HEALTH (SDOH): HOW HARD IS IT FOR YOU TO PAY FOR THE VERY BASICS LIKE FOOD, HOUSING, MEDICAL CARE, AND HEATING?: NOT HARD AT ALL

## 2022-03-04 NOTE — PROGRESS NOTES
[address]  3/21/22  Referring: Dr. Wells ref. provider found    REASON FOR CONSULT/CHIEF COMPLAINT/HPI     Reason for visit/ Chief complaint   Follow up appointment from Inpatient    HPI Matilda Lowery is a 63 y.o.male who presented to the hospital with complaints of right flank pain. Patient had fever and right flank pain, found to have 8 mm partially obstructing right kidney stone, s/p cysto and stent with relief of acute symptoms. Blood cx grew GBS on 2//25 but has already cleared. Because of bacteremia ID was consulted who requested an echo to evaluate for SBE. This didn't show any valve Doppler problems or ventricular function issues, but valves weren't seen well, I was consulted to do a DANIELA. After reviewing his cases, a DANIELA was not necessary as he had source control of the primary site of infection, cultures had already cleared, and his surface echo didn't show any significant valvular pathology.     Patient states he is here for pre operative clearance. He states he has been a febrile since discharge. He still has some discomfort in his lower left flank from the stone. Although he has had at least 2 ureteral procedures in the last 2 months, he states that his surgeons require preop cardiac clearance to do more work on his right kidney. He has no chest pain, shortness of breath, palpitations, dizziness, syncope, orthopnea, or PND.     Patient is adherent with medications and is tolerating them well without side effects     HISTORY/ALLERGIES/ROS     MedHx:  has a past medical history of Diabetes mellitus ([facility] [address] SurgHx:  has a past surgical history that includes Gastric bypass surgery; Cholecystectomy; hernia repair; Pancreas surgery; and Cystoscopy (Right, 2/25/2022). SocHx:  reports that he has never smoked. He has never used smokeless tobacco. He reports that he does not drink alcohol and does not use drugs.    FamHx: No family history of premature coronary artery disease, sudden death, or aneurysm  Allergies: Dye [gadolinium derivatives]     MEDICATIONS      Prior to Admission medications    Medication Sig Start Date End Date Taking? Authorizing Provider   Alcohol Swabs PADS  2/28/22  Yes Historical Provider, MD   Insulin Pen Needle 32G X 4 MM MISC 1 each by Does not apply route daily 3/4/22  Yes Yoel Quiles MD   metFORMIN (GLUCOPHAGE) 500 MG tablet Take 2 tablets by mouth 2 times daily (with meals) 3/4/22  Yes Yoel Quiles MD   lisinopril (PRINIVIL;ZESTRIL) 2.5 MG tablet Take 1 tablet by mouth daily 3/4/22  Yes Yoel Quiles MD   atorvastatin (LIPITOR) 20 MG tablet Take 1 tablet by mouth daily 3/4/22  Yes Yoel Quiles MD   blood glucose monitor kit and supplies Dispense sufficient amount for indicated testing frequency plus additional to accommodate PRN testing needs. Dispense all needed supplies to include: monitor, strips, lancing device, lancets, control solutions, alcohol swabs. 2/28/22  Yes Ritu Naranjo MD   blood glucose monitor strips Test 4 times a day & as needed for symptoms of irregular blood glucose. Dispense sufficient amount for indicated testing frequency plus additional to accommodate PRN testing needs. 2/28/22  Yes Ritu Naranjo MD   Lancets MISC 1 each by Does not apply route 4 times daily (before meals and nightly) 2/28/22  Yes Ritu Naranjo MD   insulin glargine (LANTUS;BASAGLAR) 100 UNIT/ML injection pen Inject 20 Units into the skin daily 3/1/22  Yes Ritu Naranjo MD   insulin lispro (HUMALOG) 100 UNIT/ML injection vial Inject 7 Units into the skin 3 times daily (before meals) 2/28/22  Yes Ritu Naranjo MD   tamsulosin (FLOMAX) 0.4 MG capsule Take 1 capsule by mouth daily 3/1/22  Yes Ritu Naranjo MD   blood glucose monitor kit and supplies Dispense sufficient amount for indicated testing frequency plus additional to accommodate PRN testing needs.  Dispense all needed supplies to include: monitor, strips, lancing device, lancets, control solutions, alcohol swabs. 2/28/22  Yes Juan Mullins MD       PHYSICAL EXAM        /81 (Site: Right Upper Arm, Position: Sitting, Cuff Size: Medium Adult)   Pulse 82   Ht 5' 11\" (1.803 m)   Wt 266 lb 6.4 oz (120.8 kg)   SpO2 98%   BMI 37.16 kg/m²     Gen Alert, cooperative, no distress Heart  Regular rate and rhythm, no murmur   Head Normocephalic, atraumatic, no abnormalities Abd  Soft, NT, +BS, no mass, no OM   Eyes PERRLA, conj/corn clear Ext  Ext nl, AT, no C/C, no edema   Nose Nares normal, no drain age, Non-tender Pulse 2+ and symmetric   Throat Lips, mucosa, tongue normal Skin Color/text/turg nl, no rash/lesions   Neck S/S, TM, NT, no bruit Psych Nl mood and affect   Lung CTA-B, unlabored, no DTP     Ch wall NT, no deform       LABS and Imaging     Relevant and available CV data reviewed    Echo 2/28/2022  -Normal left ventricle size, wall thickness, and systolic function with an   estimated ejection fraction of 50-55%. -Subtle hypokinesis of inferoseptal wall.   -Normal diastolic function.   -Poor visualization of valve leaflets. Unable to exclude vegetations. Consider DANIELA if clinical suspicion of endocarditis remains. Stress:none    Cath: none    Holter:none    ASSESSMENT AND PLAN     1. Pre operative cardiac exam for urologic surgery    He has no clinical evidence of endocarditis  Continues to have no murmur or fever  Completed antibiotics  Has no cardiac symptoms and can easily perform > 4 METs, has normal EKG and normal function on echo    Thus, no cardiac contraindication for non cardiac surgery     Follow Up: No need for further follow-up with cardiology unless new symptoms or other concerns. Ramona Singer MD  Cardiologist [address]    Scribe's Attestation: This note was scribed in the presence of Dr. Jesse Caraballo MD by Sheri Barron RN. 03/21/22     Physician Attestation  The scribe wrote this note in the presence of brenda Singer MD).   The scribe may have prepopulated components of this note with my historical  intellectual property under my direct supervision. Any additions to this intellectual property were performed in my presence and at my direction. Furthermore, the content and accuracy of this note have been reviewed by me with edits by me as needed.   Macarena Jacobo MD 3/21/2022 11:37 AM

## 2022-03-04 NOTE — ASSESSMENT & PLAN NOTE
Patient unfortunately regained weight since surgery in 2015 however still less than presurgery weight.   Diet and exercise counseling done, he currently does take multivitamin along with calcium and vitamin D replacement therapy, will update lab work next visit

## 2022-03-04 NOTE — ASSESSMENT & PLAN NOTE
Results of lipid profile reviewed and discussed with patient, currently LDL not at goal and elevated at 130, will start statin therapy and repeat labs in 6 months

## 2022-03-04 NOTE — ASSESSMENT & PLAN NOTE
Has stent placed, currently still undergoing antibiotic therapy for urosepsis, clinically symptoms improved significantly, he will follow-up with urology next week, continue increasing water intake along with Flomax.   We will hold on restarting Invokana at this point given current urinary problem

## 2022-03-04 NOTE — ASSESSMENT & PLAN NOTE
Currently uncontrolled, recently started on insulin following hospitalization. Counseled at length regarding diet and exercise, patient has clear understanding since he was previously diagnosed with diabetes back in 2009, he is aware he needs to adhere to low carbohydrate diet, cut back on calorie intake and attempt weight loss again. Restart Metformin 500 mg twice daily and increase dose to 1000 mg twice daily over the next 2 weeks, will also start low-dose lisinopril, unable to provide urine sample at this visit to check for microalbumin but will check that next visit, will restart statin therapy, patient did have biometric screen at work done in November, results reviewed and scanned into media. No evidence of peripheral neuropathy clinically or on exam, explained need for updated retinal eye exam  Patient was also on Invokana in the past however advised will not restart now until current urinary problem clear and off antibiotic. Explained to patient he may need to titrate insulin therapy down once Metformin dose is active and effective since he may start experiencing hypoglycemic episode.   He was educated on hypoglycemia correction and advised to obtain glucose tablets and to call the office if any problems and continue blood glucose testing at mealtime and bedtime  Reevaluate in 12 weeks

## 2022-03-04 NOTE — PROGRESS NOTES
ASSESSMENT/PLAN:  1. Type 2 diabetes mellitus without complication, with long-term current use of insulin ([facility])  Assessment & Plan:   Currently uncontrolled, recently started on insulin following hospitalization. Counseled at length regarding diet and exercise, patient has clear understanding since he was previously diagnosed with diabetes back in 2009, he is aware he needs to adhere to low carbohydrate diet, cut back on calorie intake and attempt weight loss again. Restart Metformin 500 mg twice daily and increase dose to 1000 mg twice daily over the next 2 weeks, will also start low-dose lisinopril, unable to provide urine sample at this visit to check for microalbumin but will check that next visit, will restart statin therapy, patient did have biometric screen at work done in November, results reviewed and scanned into media. No evidence of peripheral neuropathy clinically or on exam, explained need for updated retinal eye exam  Patient was also on Invokana in the past however advised will not restart now until current urinary problem clear and off antibiotic. Explained to patient he may need to titrate insulin therapy down once Metformin dose is active and effective since he may start experiencing hypoglycemic episode. He was educated on hypoglycemia correction and advised to obtain glucose tablets and to call the office if any problems and continue blood glucose testing at mealtime and bedtime  Reevaluate in 12 weeks  Orders:  -     Insulin Pen Needle 32G X 4 MM MISC; DAILY Starting Fri 3/4/2022, Disp-100 each, R-3, Normal  -     metFORMIN (GLUCOPHAGE) 500 MG tablet; Take 2 tablets by mouth 2 times daily (with meals), Disp-360 tablet, R-1Normal  -     lisinopril (PRINIVIL;ZESTRIL) 2.5 MG tablet; Take 1 tablet by mouth daily, Disp-90 tablet, R-1Normal  -     atorvastatin (LIPITOR) 20 MG tablet;  Take 1 tablet by mouth daily, Disp-90 tablet, R-1Normal  -     Pneumococcal polysaccharide vaccine 23-valent greater than or equal to 1yo subcutaneous/IM  2. Ureterolithiasis  Assessment & Plan:   Has stent placed, currently still undergoing antibiotic therapy for urosepsis, clinically symptoms improved significantly, he will follow-up with urology next week, continue increasing water intake along with Flomax. We will hold on restarting Invokana at this point given current urinary problem  3. Mixed hyperlipidemia  Assessment & Plan:   Results of lipid profile reviewed and discussed with patient, currently LDL not at goal and elevated at 130, will start statin therapy and repeat labs in 6 months  Orders:  -     atorvastatin (LIPITOR) 20 MG tablet; Take 1 tablet by mouth daily, Disp-90 tablet, R-1Normal  4. History of bariatric surgery  Assessment & Plan:   Patient unfortunately regained weight since surgery in 2015 however still less than presurgery weight. Diet and exercise counseling done, he currently does take multivitamin along with calcium and vitamin D replacement therapy, will update lab work next visit  5. BMI 37.0-37.9, adult  6. Colon cancer screening  -     [facility], Sy Montiel MD, Gastroenterology, [facility]  7. Need for 23-polyvalent pneumococcal polysaccharide vaccine  -     Pneumococcal polysaccharide vaccine 23-valent greater than or equal to 1yo subcutaneous/IM      Return in about 3 months (around 6/4/2022). SUBJECTIVE  HPI:   Patient here to establish new patient office visit and follow-up on recent hospitalization where he presented to the emergency room with acute pain and subsequently was found to have urolithiasis with hydronephrosis, also had severe hyperglycemia and hemoglobin A1c was found to be higher than 10.   He was diagnosed with diabetes back in 2009, at that time he was placed on Metformin and Invokana, states in 2015 he had bariatric surgery that resulted in significant weight loss and resolution of diabetes so he was taken off all his meds, he subsequently lost follow-up and did not have a primary care physician since then. He had lab work done including biometric screen through his job back in November and at that time it did show elevated glucose and hemoglobin A1c of 12.6 however states he was planning to get a PCP but never got to it  He was released from the hospital on Lantus insulin 20 units daily along with Humalog standard 7 units with meals and then sliding scale which she has been doing and has been getting blood sugars average between 1 40-1 50 with occasional fluctuation      Review of Systems   Constitutional: Negative for activity change, appetite change, fatigue and unexpected weight change. HENT: Negative for congestion, hearing loss, mouth sores and sore throat. Eyes: Negative for visual disturbance. Respiratory: Negative for cough, chest tightness, shortness of breath and wheezing. Cardiovascular: Negative for chest pain, palpitations and leg swelling. Gastrointestinal: Negative for abdominal pain, constipation, nausea and vomiting. Endocrine: Negative for cold intolerance and heat intolerance. Genitourinary: Negative for difficulty urinating, dysuria, frequency, hematuria and urgency. Musculoskeletal: Negative for arthralgias, back pain, gait problem and joint swelling. Skin: Negative for color change. Allergic/Immunologic: Negative for environmental allergies and immunocompromised state. Neurological: Negative for dizziness, syncope, speech difficulty, weakness, light-headedness and headaches. Psychiatric/Behavioral: Negative for behavioral problems, dysphoric mood and sleep disturbance. The patient is not nervous/anxious. OBJECTIVE:    /78   Pulse 94   Ht 5' 11\" (1.803 m)   Wt 269 lb 9.6 oz (122.3 kg)   BMI 37.60 kg/m²    Physical Exam  Constitutional:       General: He is not in acute distress. Appearance: Normal appearance. He is obese. He is not toxic-appearing. HENT:      Head: Normocephalic.    Eyes: Conjunctiva/sclera: Conjunctivae normal.   Cardiovascular:      Rate and Rhythm: Normal rate and regular rhythm. Heart sounds: Normal heart sounds. No murmur heard. Pulmonary:      Effort: Pulmonary effort is normal. No respiratory distress. Breath sounds: No wheezing. Abdominal:      Palpations: Abdomen is soft. Musculoskeletal:         General: Normal range of motion. Cervical back: Neck supple. No tenderness. Skin:     General: Skin is warm and dry. Neurological:      General: No focal deficit present. Mental Status: He is alert. Cranial Nerves: No cranial nerve deficit. Psychiatric:         Mood and Affect: Mood normal.           Electronically signed by Autry Homans, MD on 3/4/2022 at 2:53 PM.    This dictation was generated by voice recognition computer software. Although all attempts are made to edit the dictation for accuracy, there may be errors in the transcription that are not intended.

## 2022-03-06 NOTE — DISCHARGE SUMMARY
Hospital Medicine Discharge Summary    Patient: Amauri Crawford     Gender: male  : 1960   Age: 63 y.o. MRN: [id number]    Admitting Physician: Goyo Narvaez MD  Discharge Physician: Goyo Narvaez MD     Code Status: Prior     Admit Date: 2022   Discharge Date: 2022      Disposition:  Home  Time spent arranging discharge: 35 minutes    Discharge Diagnoses: Active Hospital Problems    Diagnosis Date Noted    Weight loss counseling, encounter for [Z71.3]     Ureterolithiasis [N20.1]     Hydronephrosis, right [N13.30]            Condition at Discharge:  [address] Course:   Mid to hospital secondary to mild right hydronephrosis and ureteral stone with UTI and strep gallop. Bacteremia patient underwent right ureteral stent placement repeat blood cultures were negative patient was on IV Rocephin had echo which per cardiology review low risk for endocarditis. Patient was discharged on  Amoxicillin per ID to complete therapy discharge Exam:    /83   Pulse 94   Temp 96.6 °F (35.9 °C) (Skin) Comment (Src): per SN  Resp 18   Ht 5' 11\" (1.803 m)   Wt 261 lb 6.4 oz (118.6 kg)   SpO2 97%   BMI 36.46 kg/m²   General appearance:  Appears comfortable. AAOx3  HEENT: atraumatic, Pupils equal, muscous membranes moist, no masses appreciated  Cardiovascular: Regular rate and rhythm no murmurs appreciated  Respiratory: CTAB no wheezing  Gastrointestinal: Abdomen soft, non-tender, BS+  EXT: no edema  Neurology: no gross focal deficts  Psychiatry: Appropriate affect. Not agitated  Skin: Warm, dry, no rashes appreciated    Discharge Medications:   Discharge Medication List as of 2022  5:54 PM      START taking these medications    Details   !! blood glucose monitor kit and supplies Dispense sufficient amount for indicated testing frequency plus additional to accommodate PRN testing needs.  Dispense all needed supplies to include: monitor, strips, lancing device, lancets, control solutions, alcohol swabs., Disp-1 kit, R-0, Normal      blood glucose monitor strips Test 4 times a day & as needed for symptoms of irregular blood glucose. Dispense sufficient amount for indicated testing frequency plus additional to accommodate PRN testing needs. , Disp-100 strip, R-3, Normal      Lancets MISC 4 TIMES DAILY BEFORE MEALS & NIGHTLY Starting Mon 2/28/2022, Disp-100 each, R-3, Normal      insulin glargine (LANTUS;BASAGLAR) 100 UNIT/ML injection pen Inject 20 Units into the skin daily, Disp-5 pen, R-3Normal      insulin lispro (HUMALOG) 100 UNIT/ML injection vial Inject 7 Units into the skin 3 times daily (before meals), Disp-10 mL, R-3Normal      tamsulosin (FLOMAX) 0.4 MG capsule Take 1 capsule by mouth daily, Disp-30 capsule, R-3Normal      amoxicillin (AMOXIL) 500 MG capsule Take 1 capsule by mouth 4 times daily for 10 days, Disp-40 capsule, R-0Normal      !! blood glucose monitor kit and supplies Dispense sufficient amount for indicated testing frequency plus additional to accommodate PRN testing needs. Dispense all needed supplies to include: monitor, strips, lancing device, lancets, control solutions, alcohol swabs., Disp-1 kit, R-0, Normal       !! - Potential duplicate medications found. Please discuss with provider. Discharge Medication List as of 2/28/2022  5:54 PM        Discharge Medication List as of 2/28/2022  5:54 PM        Discharge Medication List as of 2/28/2022  5:54 PM          Labs:  For convenience and continuity at follow-up the following most recent labs are provided:    Lab Results   Component Value Date    WBC 8.5 02/28/2022    HGB 13.4 02/28/2022    HCT 39.9 02/28/2022    MCV 88.1 02/28/2022     02/28/2022     02/28/2022    K 4.1 02/28/2022     02/28/2022    CO2 23 02/28/2022    BUN 14 02/28/2022    CREATININE 0.8 02/28/2022    CALCIUM 9.0 02/28/2022    PHOS 1.9 05/10/2017    ALKPHOS 92 02/28/2022    ALT 16 02/28/2022    AST 21 02/28/2022    BILITOT 0.4 02/28/2022    LABALBU 3.1 02/28/2022     No results found for: INR    Radiology:  ECHO Complete 2D W Doppler W Color    Result Date: 2/28/2022  Transthoracic Echocardiography Report (TTE)  Demographics   Patient Name      DARIN Patterson   Date of Study     02/28/2022          Gender              Male   Patient Number    [id number]          Date of Birth       1960   Visit Number      [id number]           Age                 63 year(s)   Accession Number  [id number]          Room Number         7517   Corporate ID      [id number]            Bernice Warner RVT,                                                            Norma Montes MD  Interpreting        Onur Mcginnis MD  Physician                             Physician  Procedure Type of Study   TTE procedure:ECHOCARDIOGRAM COMPLETE 2D W DOPPLER W COLOR. Procedure Date Date: 02/28/2022 Start: 07:56 AM Study Location: [facility] - Echo Lab Technical Quality: Adequate visualization Indications:Endocarditis. Patient Status: Routine Height: 71 inches Weight: 260.01 pounds BSA: 2.36 m2 BMI: 36.26 kg/m2 BP: 114/74 mmHg  Conclusions   Summary  -Normal left ventricle size, wall thickness, and systolic function with an  estimated ejection fraction of 50-55%. -Subtle hypokinesis of inferoseptal wall.  -Normal diastolic function.  -Poor visualization of valve leaflets. Unable to exclude vegetations. Consider DANIELA if clinical suspicion of endocarditis remains. Signature   ------------------------------------------------------------------  Electronically signed by Onur Mcginnis MD (Interpreting  physician) on 02/28/2022 at 02:31 PM  ------------------------------------------------------------------   Findings   Left Ventricle  Normal left ventricle size, wall thickness, and systolic function with an  estimated ejection fraction of 50-55%.   Subtle hypokinesis of inferoseptal wall. Normal diastolic function. AVG. E/e'= 7.9. Mitral Valve  The mitral valve normal in structure and function. No evidence of mitral regurgitation or stenosis. Left Atrium  The left atrium is normal in size. Aortic Valve  The aortic valve is structurally normal. There is no significant aortic  valve regurgitation or stenosis. Aorta  The aortic root is normal in size. Right Ventricle  The right ventricle is normal in size and function. TAPSE = 2.25 cm. RV s  velocity 12.4 cm/s. Tricuspid Valve  The tricuspid valve is normal in structure and function. There is no  significant tricuspid valve regurgitation or stenosis. Right Atrium  The right atrial size is normal.   Pulmonic Valve  The pulmonic valve is not well visualized. There is no evidence of pulmonic valve regurgitation or stenosis. Pericardial Effusion  No pericardial effusion noted. Pleural Effusion  No pleural effusion. Miscellaneous  The inferior vena was not visualized. No vegetation noted.   M-Mode/2D Measurements (cm)   LV Diastolic Dimension: 4.69 cm LV Systolic Dimension: 5.88 cm  LV Septum Diastolic: 0.9 cm  LV PW Diastolic: 6.05 cm        AO Root Dimension: 3.5 cm  RV Diastolic Dimension: 4.83 cm LA Dimension: 3.7 cm                                  LA Area: 22.2 cm2  LVOT: 2.1 cm                    LA volume/Index: 66.6 ml /28 ml/m2  Doppler Measurements   AV Peak Velocity: 118 cm/s     MV Peak E-Wave: 74.1 cm/s  AV Peak Gradient: 5.57 mmHg    MV Peak A-Wave: 69 cm/s  AV Mean Gradient: 4 mmHg       MV E/A Ratio: 1.07  LVOT Peak Velocity: 84.9 cm/s  MV Mean Gradient: 2 mmHg  AV Area (Continuity):2.91 cm2  MV Max PG: 3 mmHg                                 MV Vmax:86.1 cm/s                                 MV VTI:26.1 cm/s   E' Septal Velocity: 7.72 cm/s  MV Area (continuity): 2.61 cm2  E' Lateral Velocity: 11.9 cm/s MV Deceleration Time: 169 msec  E/E' ratio: 7.9  PV Peak Velocity: 78.9 cm/s  PV Peak Gradient: 2.49 mmHg   Aortic Valve   Peak Velocity: 118 cm/s     Mean Velocity: 91.2 cm/s  Peak Gradient: 5.57 mmHg    Mean Gradient: 4 mmHg  Area (continuity): 2.91 cm2  AV VTI: 23.4 cm  Aorta   Aortic Root: 3.5 cm  Ascending Aorta: 3.3 cm  LVOT Diameter: 2.1 cm      CT ABDOMEN PELVIS WO CONTRAST Additional Contrast? None    Result Date: 2/25/2022  EXAMINATION: CT OF THE ABDOMEN AND PELVIS WITHOUT CONTRAST 2/25/2022 8:26 am TECHNIQUE: CT of the abdomen and pelvis was performed without the administration of intravenous contrast. Multiplanar reformatted images are provided for review. Dose modulation, iterative reconstruction, and/or weight based adjustment of the mA/kV was utilized to reduce the radiation dose to as low as reasonably achievable. COMPARISON: None. HISTORY: ORDERING SYSTEM PROVIDED HISTORY: stone? TECHNOLOGIST PROVIDED HISTORY: Reason for exam:->stone? Additional Contrast?->None Decision Support Exception - unselect if not a suspected or confirmed emergency medical condition->Emergency Medical Condition (MA) Reason for Exam: right flank pain/ vomiting FINDINGS: Lower Chest:  Visualized portion of the lower chest demonstrates no acute abnormality. Organs: Diffuse hepatic steatosis. No intrahepatic biliary ductal dilation. Status post cholecystectomy. Status post distal pancreatectomy. Few punctate benign calcifications within the spleen. The bilateral adrenal glands are unremarkable. The left kidney is unremarkable without evidence of hydronephrosis. Mild right hydronephrosis secondary to proximal partially obstructing ureteral stone measuring 0.8 cm. GI/Bowel: There is no evidence of bowel obstruction. No evidence of abnormal bowel wall thickening or distension. The appendix is visualized and is unremarkable. No evidence of acute appendicitis. Pelvis: Under distended bladder. No acute abnormality of the prostate. Peritoneum/Retroperitoneum: No evidence of ascites or free air.   No evidence of lymphadenopathy. Aorta is normal in caliber. Bones/Soft Tissues:  No acute abnormality of the visualized osseous structures. Visualized soft tissues are unremarkable. Mild right hydronephrosis secondary to partially obstructing proximal ureteral stone. RECOMMENDATIONS: Unavailable     FL RETROGRADE PYELOGRAM RIGHT    Result Date: 2/25/2022  EXAMINATION: SPOT FLUOROSCOPIC IMAGES 2/25/2022 3:44 pm TECHNIQUE: Fluoroscopy was provided by the radiology department for procedure. Radiologist was not present during examination. FLUOROSCOPY DOSE AND TYPE OR TIME AND EXPOSURES: 4.0242 mGy COMPARISON: CT earlier today HISTORY: ORDERING SYSTEM PROVIDED HISTORY: stent TECHNOLOGIST PROVIDED HISTORY: Reason for exam:->stent Intraprocedural imaging. FINDINGS: 15 spot images of the right abdomen were obtained. Images demonstrate retrograde catheterization of the right ureter with mild right hydronephrosis and placement of a right ureteral stent. Intraprocedural fluoroscopic spot images as above. See separate procedure report for more information.          Signed:    Tata Carrasquillo MD   3/6/2022

## 2022-03-08 ENCOUNTER — HOSPITAL ENCOUNTER (OUTPATIENT)
Age: 62
Discharge: HOME | End: 2022-03-08
Payer: COMMERCIAL

## 2022-03-08 ENCOUNTER — HOSPITAL ENCOUNTER (OUTPATIENT)
Dept: GENERAL RADIOLOGY | Age: 62
Discharge: HOME | End: 2022-03-08
Payer: COMMERCIAL

## 2022-03-08 DIAGNOSIS — N20.1 CALCULUS OF URETER: ICD-10-CM

## 2022-03-08 PROCEDURE — 74018 RADEX ABDOMEN 1 VIEW: CPT

## 2022-03-21 ENCOUNTER — OFFICE VISIT (OUTPATIENT)
Dept: CARDIOLOGY CLINIC | Age: 62
End: 2022-03-21
Payer: COMMERCIAL

## 2022-03-21 VITALS
SYSTOLIC BLOOD PRESSURE: 121 MMHG | HEART RATE: 82 BPM | DIASTOLIC BLOOD PRESSURE: 81 MMHG | WEIGHT: 266.4 LBS | HEIGHT: 71 IN | BODY MASS INDEX: 37.3 KG/M2 | OXYGEN SATURATION: 98 %

## 2022-03-21 DIAGNOSIS — Z01.810 PREOPERATIVE CARDIOVASCULAR EXAMINATION: Primary | ICD-10-CM

## 2022-03-21 PROCEDURE — 99213 OFFICE O/P EST LOW 20 MIN: CPT | Performed by: INTERNAL MEDICINE

## 2022-03-21 NOTE — LETTER
[facility] CARDIOLOGY[address]  Dept: [phone]  Dept Fax: [phone]      2022    Karlee Paredes  : 1960  DOS: 3/21/2022    To Whom it May Concern:    Brian Melendrez has been evaluated for cardiac clearance. Brian Melendrez is considered at a low risk for surgery. There is no further cardiac testing that could be done to lower the risk. Please let my office know if you have any questions or concerns.           Shea Wagner MD                           [facility] serving [place] and [place]

## 2022-03-27 DIAGNOSIS — N20.1 URETEROLITHIASIS: ICD-10-CM

## 2022-03-27 DIAGNOSIS — E11.9 TYPE 2 DIABETES MELLITUS WITHOUT COMPLICATION, WITH LONG-TERM CURRENT USE OF INSULIN (HCC): Primary | ICD-10-CM

## 2022-03-27 DIAGNOSIS — Z79.4 TYPE 2 DIABETES MELLITUS WITHOUT COMPLICATION, WITH LONG-TERM CURRENT USE OF INSULIN (HCC): Primary | ICD-10-CM

## 2022-03-27 PROCEDURE — 3046F HEMOGLOBIN A1C LEVEL >9.0%: CPT | Performed by: INTERNAL MEDICINE

## 2022-03-28 RX ORDER — TAMSULOSIN HYDROCHLORIDE 0.4 MG/1
0.4 CAPSULE ORAL DAILY
Qty: 30 CAPSULE | Refills: 3 | Status: SHIPPED | OUTPATIENT
Start: 2022-03-28 | End: 2022-06-06 | Stop reason: SINTOL

## 2022-03-28 RX ORDER — INSULIN LISPRO 100 [IU]/ML
INJECTION, SOLUTION INTRAVENOUS; SUBCUTANEOUS
Qty: 15 ML | Refills: 3 | Status: SHIPPED | OUTPATIENT
Start: 2022-03-28 | End: 2022-07-08 | Stop reason: SDUPTHER

## 2022-03-30 ENCOUNTER — HOSPITAL ENCOUNTER (OUTPATIENT)
Dept: GENERAL RADIOLOGY | Age: 62
Discharge: HOME | End: 2022-03-30
Payer: COMMERCIAL

## 2022-03-30 ENCOUNTER — HOSPITAL ENCOUNTER (OUTPATIENT)
Age: 62
Discharge: HOME | End: 2022-03-30
Payer: COMMERCIAL

## 2022-03-30 DIAGNOSIS — N20.0 RENAL CALCULUS: ICD-10-CM

## 2022-03-30 PROCEDURE — 74018 RADEX ABDOMEN 1 VIEW: CPT

## 2022-03-31 ENCOUNTER — TELEPHONE (OUTPATIENT)
Dept: INTERNAL MEDICINE CLINIC | Age: 62
End: 2022-03-31

## 2022-03-31 DIAGNOSIS — E11.9 TYPE 2 DIABETES MELLITUS WITHOUT COMPLICATION, WITH LONG-TERM CURRENT USE OF INSULIN (HCC): Primary | ICD-10-CM

## 2022-03-31 DIAGNOSIS — Z79.4 TYPE 2 DIABETES MELLITUS WITHOUT COMPLICATION, WITH LONG-TERM CURRENT USE OF INSULIN (HCC): Primary | ICD-10-CM

## 2022-03-31 NOTE — TELEPHONE ENCOUNTER
----- Message from Monserrat Head sent at 3/31/2022 12:47 PM EDT -----  Subject: Refill Request    QUESTIONS  Name of Medication? insulin glargine (LANTUS;BASAGLAR) 100 UNIT/ML   injection pen  Patient-reported dosage and instructions? 100 unit/ml  How many days do you have left? 0  [facility]? [id number]  Pharmacy phone number (if available)? [phone]  ---------------------------------------------------------------------------  --------------  Gene Bianca GRACE  What is the best way for the office to contact you? OK to leave message on   voicemail  Preferred Call Back Phone Number?  [phone]

## 2022-04-01 RX ORDER — GLUCOSAMINE HCL/CHONDROITIN SU 500-400 MG
CAPSULE ORAL
Qty: 100 STRIP | Refills: 3 | Status: SHIPPED | OUTPATIENT
Start: 2022-04-01 | End: 2022-07-18

## 2022-05-18 ENCOUNTER — HOSPITAL ENCOUNTER (OUTPATIENT)
Dept: ULTRASOUND IMAGING | Age: 62
Discharge: HOME | End: 2022-05-18
Payer: COMMERCIAL

## 2022-05-18 DIAGNOSIS — E11.9 TYPE 2 DIABETES MELLITUS WITHOUT COMPLICATION, UNSPECIFIED WHETHER LONG TERM INSULIN USE (HCC): ICD-10-CM

## 2022-05-18 PROCEDURE — 76770 US EXAM ABDO BACK WALL COMP: CPT

## 2022-06-06 ENCOUNTER — OFFICE VISIT (OUTPATIENT)
Dept: INTERNAL MEDICINE CLINIC | Age: 62
End: 2022-06-06
Payer: COMMERCIAL

## 2022-06-06 VITALS
BODY MASS INDEX: 35.73 KG/M2 | DIASTOLIC BLOOD PRESSURE: 70 MMHG | HEIGHT: 71 IN | OXYGEN SATURATION: 99 % | HEART RATE: 86 BPM | WEIGHT: 255.2 LBS | SYSTOLIC BLOOD PRESSURE: 104 MMHG

## 2022-06-06 DIAGNOSIS — Z00.00 ANNUAL PHYSICAL EXAM: ICD-10-CM

## 2022-06-06 DIAGNOSIS — Z12.5 PROSTATE CANCER SCREENING: ICD-10-CM

## 2022-06-06 DIAGNOSIS — Z23 NEED FOR DIPHTHERIA-TETANUS-PERTUSSIS (TDAP) VACCINE: ICD-10-CM

## 2022-06-06 DIAGNOSIS — Z00.00 ANNUAL PHYSICAL EXAM: Primary | ICD-10-CM

## 2022-06-06 DIAGNOSIS — E11.9 TYPE 2 DIABETES MELLITUS WITHOUT COMPLICATION, WITH LONG-TERM CURRENT USE OF INSULIN (HCC): ICD-10-CM

## 2022-06-06 DIAGNOSIS — Z79.4 TYPE 2 DIABETES MELLITUS WITHOUT COMPLICATION, WITH LONG-TERM CURRENT USE OF INSULIN (HCC): ICD-10-CM

## 2022-06-06 DIAGNOSIS — E78.2 MIXED HYPERLIPIDEMIA: ICD-10-CM

## 2022-06-06 LAB
A/G RATIO: 1.2 (ref 1.1–2.2)
ALBUMIN SERPL-MCNC: 3.9 G/DL (ref 3.4–5)
ALP BLD-CCNC: 104 U/L (ref 40–129)
ALT SERPL-CCNC: 10 U/L (ref 10–40)
ANION GAP SERPL CALCULATED.3IONS-SCNC: 13 MMOL/L (ref 3–16)
AST SERPL-CCNC: 14 U/L (ref 15–37)
BILIRUB SERPL-MCNC: 0.5 MG/DL (ref 0–1)
BUN BLDV-MCNC: 15 MG/DL (ref 7–20)
CALCIUM SERPL-MCNC: 9.2 MG/DL (ref 8.3–10.6)
CHLORIDE BLD-SCNC: 102 MMOL/L (ref 99–110)
CHOLESTEROL, TOTAL: 116 MG/DL (ref 0–199)
CO2: 25 MMOL/L (ref 21–32)
CREAT SERPL-MCNC: 0.8 MG/DL (ref 0.9–1.3)
CREATININE URINE: 245 MG/DL (ref 39–259)
GFR AFRICAN AMERICAN: >60
GFR NON-AFRICAN AMERICAN: >60
GLUCOSE BLD-MCNC: 87 MG/DL (ref 70–99)
HCT VFR BLD CALC: 39.6 % (ref 40.5–52.5)
HDLC SERPL-MCNC: 44 MG/DL (ref 40–60)
HEMOGLOBIN: 13.3 G/DL (ref 13.5–17.5)
HEPATITIS C ANTIBODY INTERPRETATION: NORMAL
LDL CHOLESTEROL CALCULATED: 60 MG/DL
MCH RBC QN AUTO: 30.1 PG (ref 26–34)
MCHC RBC AUTO-ENTMCNC: 33.6 G/DL (ref 31–36)
MCV RBC AUTO: 89.4 FL (ref 80–100)
MICROALBUMIN UR-MCNC: 1.4 MG/DL
MICROALBUMIN/CREAT UR-RTO: 5.7 MG/G (ref 0–30)
PDW BLD-RTO: 13.9 % (ref 12.4–15.4)
PLATELET # BLD: 274 K/UL (ref 135–450)
PMV BLD AUTO: 8.3 FL (ref 5–10.5)
POTASSIUM SERPL-SCNC: 4.2 MMOL/L (ref 3.5–5.1)
PROSTATE SPECIFIC ANTIGEN: 0.43 NG/ML (ref 0–4)
RBC # BLD: 4.43 M/UL (ref 4.2–5.9)
SODIUM BLD-SCNC: 140 MMOL/L (ref 136–145)
TOTAL PROTEIN: 7.2 G/DL (ref 6.4–8.2)
TRIGL SERPL-MCNC: 60 MG/DL (ref 0–150)
VLDLC SERPL CALC-MCNC: 12 MG/DL
WBC # BLD: 8.1 K/UL (ref 4–11)

## 2022-06-06 PROCEDURE — 99396 PREV VISIT EST AGE 40-64: CPT | Performed by: INTERNAL MEDICINE

## 2022-06-06 PROCEDURE — 90471 IMMUNIZATION ADMIN: CPT | Performed by: INTERNAL MEDICINE

## 2022-06-06 PROCEDURE — 90715 TDAP VACCINE 7 YRS/> IM: CPT | Performed by: INTERNAL MEDICINE

## 2022-06-06 RX ORDER — INSULIN LISPRO 100 [IU]/ML
INJECTION, SOLUTION INTRAVENOUS; SUBCUTANEOUS
Qty: 15 ML | Refills: 3 | Status: CANCELLED | OUTPATIENT
Start: 2022-06-06

## 2022-06-06 RX ORDER — FLASH GLUCOSE SENSOR
KIT MISCELLANEOUS
Qty: 6 EACH | Refills: 3 | Status: SHIPPED | OUTPATIENT
Start: 2022-06-06

## 2022-06-06 RX ORDER — FLASH GLUCOSE SCANNING READER
EACH MISCELLANEOUS
Qty: 6 EACH | Refills: 3 | Status: SHIPPED | OUTPATIENT
Start: 2022-06-06

## 2022-06-06 RX ORDER — ATORVASTATIN CALCIUM 20 MG/1
20 TABLET, FILM COATED ORAL DAILY
Qty: 90 TABLET | Refills: 1 | Status: SHIPPED | OUTPATIENT
Start: 2022-06-06

## 2022-06-06 RX ORDER — LISINOPRIL 2.5 MG/1
2.5 TABLET ORAL DAILY
Qty: 90 TABLET | Refills: 1 | Status: SHIPPED | OUTPATIENT
Start: 2022-06-06

## 2022-06-06 ASSESSMENT — ENCOUNTER SYMPTOMS
VOMITING: 0
COUGH: 0
COLOR CHANGE: 0
BACK PAIN: 0
NAUSEA: 0
CONSTIPATION: 0
ABDOMINAL PAIN: 0
WHEEZING: 0
SHORTNESS OF BREATH: 0
CHEST TIGHTNESS: 0
SORE THROAT: 0

## 2022-06-06 NOTE — ASSESSMENT & PLAN NOTE
Age-related health maintenance and immunization recommendations reviewed and discussed with patient, patient is mostly up-to-date with all recommended vaccines, will update Tdap today, he is aware to complete hepatitis B series in the near future.   Labs ordered, healthy diet and regular exercise recommendations reinforced, BMI recommendations discussed with patient and he will continue working towards weight loss  Colonoscopy completed recently  Check PSA today, denies any urinary symptoms  Depression screen negative  Follow-up in 3 months

## 2022-06-06 NOTE — PROGRESS NOTES
ASSESSMENT/PLAN:  1. Annual physical exam  Assessment & Plan:   Age-related health maintenance and immunization recommendations reviewed and discussed with patient, patient is mostly up-to-date with all recommended vaccines, will update Tdap today, he is aware to complete hepatitis B series in the near future. Labs ordered, healthy diet and regular exercise recommendations reinforced, BMI recommendations discussed with patient and he will continue working towards weight loss  Colonoscopy completed recently  Check PSA today, denies any urinary symptoms  Depression screen negative  Follow-up in 3 months  Orders:  -     Hemoglobin A1C; Future  -     Lipid Panel; Future  -     CBC; Future  -     Comprehensive Metabolic Panel; Future  -     Microalbumin / Creatinine Urine Ratio; Future  -     HIV Screen; Future  -     PSA, Prostatic Specific Antigen; Future  -     Hepatitis C Antibody; Future  -     Tdap (age 6y and older) IM (Boostrix)  2. Type 2 diabetes mellitus without complication, with long-term current use of insulin (HCC)  Assessment & Plan:   Home blood glucose readings are under control, will update labs and will probably scale back on insulin and restart Invokana or Jardiance along with metformin, plan is to discontinue insulin therapy and continue with oral medications since patient has been successful with diet adjustments and following modified carb intake. He will complete eye exam in near future, foot exam with no evidence of neuropathy  Further recommendations will be pending lab results, he is already on ACE inhibitor and statin therapy with good tolerance  Orders:  -     metFORMIN (GLUCOPHAGE) 500 MG tablet; Take 2 tablets by mouth 2 times daily (with meals), Disp-360 tablet, R-1Normal  -     lisinopril (PRINIVIL;ZESTRIL) 2.5 MG tablet;  Take 1 tablet by mouth daily, Disp-90 tablet, R-1Normal  -     Continuous Blood Gluc  (FREESTYLE MARIA DE JESUS 14 DAY READER) GAYE; Use as directed, Disp-6 each, R-3Normal  -     Continuous Blood Gluc Sensor (FREESTYLE MARIA DE JESUS 2 SENSOR) [facility]; Use as directed, Disp-6 each, R-3Normal  3. Mixed hyperlipidemia  -     atorvastatin (LIPITOR) 20 MG tablet; Take 1 tablet by mouth daily, Disp-90 tablet, R-1Normal  4. Need for diphtheria-tetanus-pertussis (Tdap) vaccine  -     Tdap (age 6y and older) IM (Boostrix)  5. Prostate cancer screening  -     PSA, Prostatic Specific Antigen; Future      Return in about 3 months (around 9/6/2022). SUBJECTIVE  HPI:   Patient here to get annual wellness screen with biometrics for insurance purposes and 3 months follow-up on diabetes. He has been doing well since his last visit here he has since followed up with urology, the stent has been removed, he discontinued Flomax due to to side effects however he is scheduled to follow-up with urology this month. He is denying any trouble urinating or any pain or discomfort  Has been compliant with taking all medications including insulin, his average blood sugars are at target goal with fasting generally below 100, there has been about 10 pounds weight loss over the past 3 months. He did complete colonoscopy, he also completed his second COVID booster and Shingrix vaccine series. He walks on daily basis      Review of Systems   Constitutional: Negative for activity change, appetite change, fatigue and unexpected weight change. HENT: Negative for congestion, dental problem, hearing loss, mouth sores and sore throat. Eyes: Negative for visual disturbance. Respiratory: Negative for cough, chest tightness, shortness of breath and wheezing. Cardiovascular: Negative for chest pain, palpitations and leg swelling. Gastrointestinal: Negative for abdominal pain, constipation, nausea and vomiting. Genitourinary: Negative for difficulty urinating, dysuria, frequency, hematuria and urgency. Musculoskeletal: Negative for arthralgias, back pain, gait problem, joint swelling and neck stiffness. Skin: Negative for color change. Allergic/Immunologic: Negative for environmental allergies and immunocompromised state. Neurological: Negative for dizziness, weakness, light-headedness and headaches. Hematological: Does not bruise/bleed easily. Psychiatric/Behavioral: Negative for behavioral problems, dysphoric mood and sleep disturbance. The patient is not nervous/anxious. OBJECTIVE:    /70   Pulse 86   Ht 5' 11\" (1.803 m)   Wt 255 lb 3.2 oz (115.8 kg)   SpO2 99%   BMI 35.59 kg/m²    Physical Exam  Constitutional:       General: He is not in acute distress. Appearance: Normal appearance. He is obese. HENT:      Head: Normocephalic. Right Ear: Tympanic membrane and ear canal normal.      Left Ear: Tympanic membrane and ear canal normal.      Nose: Nose normal.      Mouth/Throat:      Mouth: Mucous membranes are moist.      Pharynx: Oropharynx is clear. Eyes:      General: No scleral icterus. Extraocular Movements: Extraocular movements intact. Conjunctiva/sclera: Conjunctivae normal.      Pupils: Pupils are equal, round, and reactive to light. Cardiovascular:      Rate and Rhythm: Normal rate and regular rhythm. Pulses: Normal pulses. Heart sounds: Normal heart sounds. No murmur heard. Pulmonary:      Effort: Pulmonary effort is normal. No respiratory distress. Breath sounds: Normal breath sounds. No wheezing. Abdominal:      General: Bowel sounds are normal.      Palpations: Abdomen is soft. There is no mass. Tenderness: There is no abdominal tenderness. There is no rebound. Musculoskeletal:         General: No swelling, deformity or signs of injury. Normal range of motion. Cervical back: Normal range of motion and neck supple. Right lower leg: No edema. Left lower leg: No edema. Skin:     General: Skin is warm and dry. Neurological:      General: No focal deficit present.       Mental Status: He is alert and

## 2022-06-07 DIAGNOSIS — Z79.4 TYPE 2 DIABETES MELLITUS WITHOUT COMPLICATION, WITH LONG-TERM CURRENT USE OF INSULIN (HCC): Primary | ICD-10-CM

## 2022-06-07 DIAGNOSIS — E11.9 TYPE 2 DIABETES MELLITUS WITHOUT COMPLICATION, WITH LONG-TERM CURRENT USE OF INSULIN (HCC): Primary | ICD-10-CM

## 2022-06-07 LAB
ESTIMATED AVERAGE GLUCOSE: 134.1 MG/DL
HBA1C MFR BLD: 6.3 %
HIV AG/AB: NORMAL
HIV ANTIGEN: NORMAL
HIV-1 ANTIBODY: NORMAL
HIV-2 AB: NORMAL

## 2022-06-25 ENCOUNTER — PATIENT MESSAGE (OUTPATIENT)
Dept: INTERNAL MEDICINE CLINIC | Age: 62
End: 2022-06-25

## 2022-06-25 DIAGNOSIS — E11.9 TYPE 2 DIABETES MELLITUS WITHOUT COMPLICATION, WITH LONG-TERM CURRENT USE OF INSULIN (HCC): ICD-10-CM

## 2022-06-25 DIAGNOSIS — Z79.4 TYPE 2 DIABETES MELLITUS WITHOUT COMPLICATION, WITH LONG-TERM CURRENT USE OF INSULIN (HCC): ICD-10-CM

## 2022-06-27 NOTE — TELEPHONE ENCOUNTER
From: Myles Thibodeaux  To: Dr. Wolf Messin2022 9:11 AM EDT  Subject: Refill Pen Needles     Would you please refill the Insulin Pen Needle 32G X 4 MM prescription though the [facility] pharmacy.  Thanks

## 2022-07-06 PROBLEM — Z00.00 ANNUAL PHYSICAL EXAM: Status: RESOLVED | Noted: 2022-06-06 | Resolved: 2022-07-06

## 2022-07-11 ENCOUNTER — TELEPHONE (OUTPATIENT)
Dept: ADMINISTRATIVE | Age: 62
End: 2022-07-11

## 2022-07-11 NOTE — TELEPHONE ENCOUNTER
Submitted PA for [facility] 2 Sensor. Key: [id number]. Via CM STATUS: APPROVED 07/11/2022; Coverage End Date:07/15/2023. Will scan letter when received. If this requires a response please respond to the pool ( P [facility] [address] [facility]). Thank you please advise patient.

## 2022-07-11 NOTE — TELEPHONE ENCOUNTER
Submitted PA for . [employer] 14 Day [place] device. Key: [id number]. Via CM STATUS: APPROVED 07/11/2022; Coverage End Date:07/12/2023. Will scan letter when received. If this requires a response please respond to the pool ( P [facility] [address] [facility]). Thank you please advise patient. 37.3

## 2022-07-16 DIAGNOSIS — E11.9 TYPE 2 DIABETES MELLITUS WITHOUT COMPLICATION, WITH LONG-TERM CURRENT USE OF INSULIN (HCC): ICD-10-CM

## 2022-07-16 DIAGNOSIS — Z79.4 TYPE 2 DIABETES MELLITUS WITHOUT COMPLICATION, WITH LONG-TERM CURRENT USE OF INSULIN (HCC): ICD-10-CM

## 2022-07-18 RX ORDER — BLOOD SUGAR DIAGNOSTIC
STRIP MISCELLANEOUS
Qty: 100 STRIP | Refills: 3 | Status: SHIPPED | OUTPATIENT
Start: 2022-07-18

## 2022-09-12 ENCOUNTER — OFFICE VISIT (OUTPATIENT)
Dept: INTERNAL MEDICINE CLINIC | Age: 62
End: 2022-09-12
Payer: COMMERCIAL

## 2022-09-12 VITALS
BODY MASS INDEX: 34.64 KG/M2 | HEIGHT: 71 IN | WEIGHT: 247.4 LBS | SYSTOLIC BLOOD PRESSURE: 104 MMHG | OXYGEN SATURATION: 96 % | HEART RATE: 86 BPM | DIASTOLIC BLOOD PRESSURE: 62 MMHG

## 2022-09-12 DIAGNOSIS — Z79.4 TYPE 2 DIABETES MELLITUS WITHOUT COMPLICATION, WITH LONG-TERM CURRENT USE OF INSULIN (HCC): Primary | ICD-10-CM

## 2022-09-12 DIAGNOSIS — E11.9 TYPE 2 DIABETES MELLITUS WITHOUT COMPLICATION, WITH LONG-TERM CURRENT USE OF INSULIN (HCC): Primary | ICD-10-CM

## 2022-09-12 DIAGNOSIS — E78.2 MIXED HYPERLIPIDEMIA: ICD-10-CM

## 2022-09-12 DIAGNOSIS — R04.0 EPISTAXIS, RECURRENT: ICD-10-CM

## 2022-09-12 LAB — HBA1C MFR BLD: 6.1 %

## 2022-09-12 PROCEDURE — 99214 OFFICE O/P EST MOD 30 MIN: CPT | Performed by: INTERNAL MEDICINE

## 2022-09-12 PROCEDURE — 83036 HEMOGLOBIN GLYCOSYLATED A1C: CPT | Performed by: INTERNAL MEDICINE

## 2022-09-12 PROCEDURE — 3044F HG A1C LEVEL LT 7.0%: CPT | Performed by: INTERNAL MEDICINE

## 2022-09-12 ASSESSMENT — ENCOUNTER SYMPTOMS
COLOR CHANGE: 0
SORE THROAT: 0
CONSTIPATION: 0
WHEEZING: 0
ABDOMINAL PAIN: 0
CHEST TIGHTNESS: 0
COUGH: 0
VOMITING: 0
BACK PAIN: 0
SHORTNESS OF BREATH: 0
NAUSEA: 0

## 2022-09-12 NOTE — ASSESSMENT & PLAN NOTE
Has been steadily losing weight, encouraged to continue with diet changes and lifestyle modification.

## 2022-09-12 NOTE — PROGRESS NOTES
ASSESSMENT/PLAN:  1. Type 2 diabetes mellitus without complication, with long-term current use of insulin (HCC)  Assessment & Plan:   Continues to be stable and at target goal with a hemoglobin A1c of 6.1 this visit, encouraged to continue carb counting and dietary management, with goal of not needing to substitute with mealtime insulin. Continue Jardiance and metformin and reevaluate in 3 months  Reminded of need for updated ophthalmology exam, foot evaluation continues to be without evidence of neuropathy  Orders:  -     POCT glycosylated hemoglobin (Hb A1C)  2. Mixed hyperlipidemia  Assessment & Plan: We will update lipid profile next visit, encouraged to continue maintaining low-carb low-fat diet and continue attempts for weight loss, continue current dose of atorvastatin  3. BMI 34.0-34.9,adult  Assessment & Plan:   Has been steadily losing weight, encouraged to continue with diet changes and lifestyle modification. 4. Epistaxis, recurrent  Assessment & Plan:   Exam within normal findings except for possibly polyps right nostril. Advised to use saline spray and Vaseline, can also try humidifier in bedroom to avoid dryness. Encouraged to call office if no improvement      Return in about 3 months (around 12/12/2022) for routine check up. SUBJECTIVE  HPI:   Patient here for 3-month follow-up on diabetes and chronic medical problems. Has been doing relatively well since last visit, Lantus was discontinued, currently managing by diet, metformin and Jardiance, in addition to mealtime Humalog that he reports using average twice a day. Weight is down by few pounds from last time    Diabetes  He presents for his follow-up diabetic visit. He has type 2 diabetes mellitus. His disease course has been improving. There are no hypoglycemic associated symptoms. Pertinent negatives for hypoglycemia include no dizziness or headaches. There are no diabetic associated symptoms.  Pertinent negatives for diabetes include no chest pain and no fatigue. There are no hypoglycemic complications. There are no diabetic complications. Risk factors for coronary artery disease include diabetes mellitus, male sex and obesity. Current diabetic treatment includes diet, insulin injections and oral agent (dual therapy). He is compliant with treatment most of the time. His weight is decreasing steadily. He is following a generally healthy and diabetic diet. Meal planning includes avoidance of concentrated sweets and carbohydrate counting. There is no change in his home blood glucose trend. His overall blood glucose range is 110-130 mg/dl. An ACE inhibitor/angiotensin II receptor blocker is being taken. He does not see a podiatrist.Eye exam is not current. Lab Results   Component Value Date/Time    LABA1C 6.1 09/12/2022 08:21 AM    LABA1C 6.3 06/06/2022 09:30 AM    LABA1C 11.9 02/25/2022 10:45 AM       Review of Systems   Constitutional:  Negative for activity change, appetite change and fatigue. HENT:  Positive for nosebleeds. Negative for congestion, hearing loss, mouth sores, postnasal drip and sore throat. Respiratory:  Negative for cough, chest tightness, shortness of breath and wheezing. Cardiovascular:  Negative for chest pain, palpitations and leg swelling. Gastrointestinal:  Negative for abdominal pain, constipation, nausea and vomiting. Genitourinary:  Negative for difficulty urinating, dysuria, frequency, hematuria and urgency. Musculoskeletal:  Negative for arthralgias, back pain, gait problem and joint swelling. Skin:  Negative for color change. Allergic/Immunologic: Positive for environmental allergies. Negative for immunocompromised state. Neurological:  Negative for dizziness, light-headedness and headaches. Psychiatric/Behavioral:  Negative for behavioral problems and dysphoric mood.       OBJECTIVE:    /62   Pulse 86   Ht 5' 11\" (1.803 m)   Wt 247 lb 6.4 oz (112.2 kg)   SpO2 96%   BMI 34.51 kg/m² Physical Exam  Constitutional:       General: He is not in acute distress. Appearance: Normal appearance. He is not toxic-appearing. HENT:      Head: Normocephalic. Nose: Congestion present. No rhinorrhea. Comments: Turbinate hypertrophy right side  Eyes:      Conjunctiva/sclera: Conjunctivae normal.   Cardiovascular:      Rate and Rhythm: Normal rate. Heart sounds: Normal heart sounds. Pulmonary:      Effort: Pulmonary effort is normal. No respiratory distress. Abdominal:      Palpations: Abdomen is soft. Musculoskeletal:         General: Normal range of motion. Skin:     General: Skin is warm and dry. Neurological:      General: No focal deficit present. Mental Status: He is alert. Cranial Nerves: No cranial nerve deficit. Psychiatric:         Mood and Affect: Mood normal.         Electronically signed by Saniya Mercer MD on 9/12/2022 at 8:34 AM.    This dictation was generated by voice recognition computer software. Although all attempts are made to edit the dictation for accuracy, there may be errors in the transcription that are not intended.

## 2022-09-12 NOTE — ASSESSMENT & PLAN NOTE
Exam within normal findings except for possibly polyps right nostril. Advised to use saline spray and Vaseline, can also try humidifier in bedroom to avoid dryness.   Encouraged to call office if no improvement

## 2022-09-12 NOTE — ASSESSMENT & PLAN NOTE
Continues to be stable and at target goal with a hemoglobin A1c of 6.1 this visit, encouraged to continue carb counting and dietary management, with goal of not needing to substitute with mealtime insulin.   Continue Jardiance and metformin and reevaluate in 3 months  Reminded of need for updated ophthalmology exam, foot evaluation continues to be without evidence of neuropathy

## 2022-09-12 NOTE — ASSESSMENT & PLAN NOTE
We will update lipid profile next visit, encouraged to continue maintaining low-carb low-fat diet and continue attempts for weight loss, continue current dose of atorvastatin

## 2022-11-08 DIAGNOSIS — E11.9 TYPE 2 DIABETES MELLITUS WITHOUT COMPLICATION, WITH LONG-TERM CURRENT USE OF INSULIN (HCC): ICD-10-CM

## 2022-11-08 DIAGNOSIS — Z79.4 TYPE 2 DIABETES MELLITUS WITHOUT COMPLICATION, WITH LONG-TERM CURRENT USE OF INSULIN (HCC): ICD-10-CM

## 2022-11-08 RX ORDER — PEN NEEDLE, DIABETIC 32GX 5/32"
NEEDLE, DISPOSABLE MISCELLANEOUS
Qty: 100 EACH | Refills: 13 | Status: SHIPPED | OUTPATIENT
Start: 2022-11-08

## 2022-11-11 DIAGNOSIS — E11.9 TYPE 2 DIABETES MELLITUS WITHOUT COMPLICATION, WITH LONG-TERM CURRENT USE OF INSULIN (HCC): ICD-10-CM

## 2022-11-11 DIAGNOSIS — Z79.4 TYPE 2 DIABETES MELLITUS WITHOUT COMPLICATION, WITH LONG-TERM CURRENT USE OF INSULIN (HCC): ICD-10-CM

## 2022-11-11 RX ORDER — EMPAGLIFLOZIN 25 MG/1
TABLET, FILM COATED ORAL
Qty: 90 TABLET | Refills: 1 | Status: SHIPPED | OUTPATIENT
Start: 2022-11-11

## 2022-11-11 NOTE — TELEPHONE ENCOUNTER
Future Appointments    Encounter Information    Provider Department Appt Notes   12/12/2022 Woo Palacio MD [facility] Internal Medicine 3 months for routine check up.      Past Visits    Date Provider Specialty Visit Type Primary Dx   09/12/2022 Woo Palacio MD Internal Medicine Office Visit Type 2 diabetes mellitus without complication, with long-term current use of insulin ([facility] [address]

## 2022-11-29 DIAGNOSIS — Z79.4 TYPE 2 DIABETES MELLITUS WITHOUT COMPLICATION, WITH LONG-TERM CURRENT USE OF INSULIN (HCC): ICD-10-CM

## 2022-11-29 DIAGNOSIS — E78.2 MIXED HYPERLIPIDEMIA: ICD-10-CM

## 2022-11-29 DIAGNOSIS — E11.9 TYPE 2 DIABETES MELLITUS WITHOUT COMPLICATION, WITH LONG-TERM CURRENT USE OF INSULIN (HCC): ICD-10-CM

## 2022-11-30 RX ORDER — ATORVASTATIN CALCIUM 20 MG/1
TABLET, FILM COATED ORAL
Qty: 90 TABLET | Refills: 0 | Status: SHIPPED | OUTPATIENT
Start: 2022-11-30 | End: 2022-12-12 | Stop reason: SDUPTHER

## 2022-11-30 RX ORDER — LISINOPRIL 2.5 MG/1
TABLET ORAL
Qty: 90 TABLET | Refills: 0 | Status: SHIPPED | OUTPATIENT
Start: 2022-11-30 | End: 2022-12-12 | Stop reason: SDUPTHER

## 2022-12-02 ENCOUNTER — HOSPITAL ENCOUNTER (OUTPATIENT)
Dept: GENERAL RADIOLOGY | Age: 62
Discharge: HOME | End: 2022-12-02
Payer: COMMERCIAL

## 2022-12-02 ENCOUNTER — HOSPITAL ENCOUNTER (OUTPATIENT)
Age: 62
Discharge: HOME | End: 2022-12-02
Payer: COMMERCIAL

## 2022-12-02 DIAGNOSIS — N20.1 CALCULUS OF URETER: ICD-10-CM

## 2022-12-02 PROCEDURE — 74018 RADEX ABDOMEN 1 VIEW: CPT

## 2022-12-12 ENCOUNTER — OFFICE VISIT (OUTPATIENT)
Dept: INTERNAL MEDICINE CLINIC | Age: 62
End: 2022-12-12
Payer: COMMERCIAL

## 2022-12-12 VITALS
SYSTOLIC BLOOD PRESSURE: 110 MMHG | HEART RATE: 82 BPM | OXYGEN SATURATION: 97 % | DIASTOLIC BLOOD PRESSURE: 70 MMHG | WEIGHT: 250 LBS | HEIGHT: 71 IN | BODY MASS INDEX: 35 KG/M2

## 2022-12-12 DIAGNOSIS — E78.2 MIXED HYPERLIPIDEMIA: ICD-10-CM

## 2022-12-12 DIAGNOSIS — Z79.4 TYPE 2 DIABETES MELLITUS WITHOUT COMPLICATION, WITH LONG-TERM CURRENT USE OF INSULIN (HCC): Primary | ICD-10-CM

## 2022-12-12 DIAGNOSIS — E11.9 TYPE 2 DIABETES MELLITUS WITHOUT COMPLICATION, WITH LONG-TERM CURRENT USE OF INSULIN (HCC): Primary | ICD-10-CM

## 2022-12-12 DIAGNOSIS — R04.0 EPISTAXIS, RECURRENT: ICD-10-CM

## 2022-12-12 PROCEDURE — 99214 OFFICE O/P EST MOD 30 MIN: CPT | Performed by: INTERNAL MEDICINE

## 2022-12-12 PROCEDURE — 3044F HG A1C LEVEL LT 7.0%: CPT | Performed by: INTERNAL MEDICINE

## 2022-12-12 RX ORDER — LISINOPRIL 2.5 MG/1
2.5 TABLET ORAL DAILY
Qty: 90 TABLET | Refills: 1 | Status: SHIPPED | OUTPATIENT
Start: 2022-12-12

## 2022-12-12 RX ORDER — INSULIN LISPRO 100 [IU]/ML
7 INJECTION, SOLUTION INTRAVENOUS; SUBCUTANEOUS
Qty: 12 EACH | Refills: 1 | Status: SHIPPED | OUTPATIENT
Start: 2022-12-12

## 2022-12-12 RX ORDER — ATORVASTATIN CALCIUM 20 MG/1
20 TABLET, FILM COATED ORAL DAILY
Qty: 90 TABLET | Refills: 1 | Status: SHIPPED | OUTPATIENT
Start: 2022-12-12 | End: 2022-12-12 | Stop reason: SDUPTHER

## 2022-12-12 RX ORDER — LISINOPRIL 2.5 MG/1
2.5 TABLET ORAL DAILY
Qty: 90 TABLET | Refills: 1 | Status: SHIPPED | OUTPATIENT
Start: 2022-12-12 | End: 2022-12-12 | Stop reason: SDUPTHER

## 2022-12-12 RX ORDER — ATORVASTATIN CALCIUM 20 MG/1
20 TABLET, FILM COATED ORAL DAILY
Qty: 90 TABLET | Refills: 1 | Status: SHIPPED | OUTPATIENT
Start: 2022-12-12

## 2022-12-12 RX ORDER — INSULIN LISPRO 100 [IU]/ML
7 INJECTION, SOLUTION INTRAVENOUS; SUBCUTANEOUS
Qty: 12 EACH | Refills: 1 | Status: SHIPPED | OUTPATIENT
Start: 2022-12-12 | End: 2022-12-12 | Stop reason: SDUPTHER

## 2022-12-12 ASSESSMENT — ENCOUNTER SYMPTOMS
CHEST TIGHTNESS: 0
COLOR CHANGE: 0
SORE THROAT: 0
NAUSEA: 0
COUGH: 0
BACK PAIN: 0
VOMITING: 0
WHEEZING: 0
ABDOMINAL PAIN: 0
CONSTIPATION: 0
SHORTNESS OF BREATH: 0

## 2022-12-12 NOTE — ASSESSMENT & PLAN NOTE
Lipid results reviewed, remains at target goal, continue current dose of Lipitor along with diet and exercise recommendations

## 2022-12-12 NOTE — PROGRESS NOTES
ASSESSMENT/PLAN:  1. Type 2 diabetes mellitus without complication, with long-term current use of insulin (HCC)  Assessment & Plan:   Diabetes continue to be stable and well-controlled, hemoglobin A1c results reviewed and discussed with patient, diet and exercise recommendations reinforced, need for yearly ophthalmology exam explained to patient, continues to be without any evidence of neuropathy, foot care recommendations discussed with patient  Follow-up in 6 months if insurance does not change as patient is reporting may need to find new PCP as insurance may no longer be affiliated with [facility]). Given 6 months supply of meds  Orders:  -     empagliflozin (JARDIANCE) 25 MG tablet; Take 1 tablet by mouth daily, Disp-90 tablet, R-1Normal  -     insulin lispro (HUMALOG) 100 UNIT/ML injection cartridge; Inject 7 Units into the skin 3 times daily (before meals), Disp-12 each, R-1Normal  -     lisinopril (PRINIVIL;ZESTRIL) 2.5 MG tablet; Take 1 tablet by mouth daily, Disp-90 tablet, R-1Normal  -     metFORMIN (GLUCOPHAGE) 500 MG tablet; Take 2 tablets by mouth 2 times daily (with meals), Disp-360 tablet, R-1Normal  2. Mixed hyperlipidemia  Assessment & Plan:   Lipid results reviewed, remains at target goal, continue current dose of Lipitor along with diet and exercise recommendations  Orders:  -     atorvastatin (LIPITOR) 20 MG tablet; Take 1 tablet by mouth daily, Disp-90 tablet, R-1Normal  3. BMI 34.0-34.9,adult  Assessment & Plan:   Reinforced recommendations for weight loss, discussed with patient need to cut back on total calorie intake, cut back on carbs and maintain regular level of physical activity preferably with 30 minutes of cardio at least 3 times a week  4.  Epistaxis, recurrent  Assessment & Plan:   Improved, still occasionally gets mild episodes of epistaxis however has been trying to maintain humidity and living environment to avoid dryness    Return in about 6 months (around 6/12/2023) for annual physical.     SUBJECTIVE  HPI:   To follow-up on diabetes and chronic medical problems, he recently had updated lab work through his employment to complete biometric Screen, results available in epic. Has been doing well, hemoglobin A1c stable at 6.0, weight is stable, he acknowledges he needs to do better in terms of diet and weight loss. Used to denies any tingling or numbness, believes he is up-to-date with ophthalmology exam    Lab Results   Component Value Date/Time    LABA1C 6.1 09/12/2022 08:21 AM    LABA1C 6.3 06/06/2022 09:30 AM    LABA1C 11.9 02/25/2022 10:45 AM         Review of Systems   Constitutional:  Negative for activity change, appetite change, fatigue and unexpected weight change. HENT:  Negative for congestion, hearing loss, mouth sores and sore throat. Eyes:  Negative for visual disturbance. Respiratory:  Negative for cough, chest tightness, shortness of breath and wheezing. Cardiovascular:  Negative for chest pain, palpitations and leg swelling. Gastrointestinal:  Negative for abdominal pain, constipation, nausea and vomiting. Genitourinary:  Negative for difficulty urinating, dysuria, frequency, hematuria and urgency. Musculoskeletal:  Negative for arthralgias, back pain, gait problem and joint swelling. Skin:  Negative for color change. Allergic/Immunologic: Negative for environmental allergies and immunocompromised state. Neurological:  Negative for dizziness, speech difficulty, weakness, light-headedness and headaches. Psychiatric/Behavioral:  Negative for behavioral problems and dysphoric mood. OBJECTIVE:    /70   Pulse 82   Ht 5' 11\" (1.803 m)   Wt 250 lb (113.4 kg)   SpO2 97%   BMI 34.87 kg/m²    Physical Exam  Constitutional:       General: He is not in acute distress. Appearance: Normal appearance. He is obese. He is not toxic-appearing. HENT:      Head: Normocephalic.    Eyes:      Conjunctiva/sclera: Conjunctivae normal. Cardiovascular:      Rate and Rhythm: Normal rate and regular rhythm. Heart sounds: Normal heart sounds. Pulmonary:      Effort: Pulmonary effort is normal. No respiratory distress. Abdominal:      Palpations: Abdomen is soft. Tenderness: There is no abdominal tenderness. Musculoskeletal:         General: Normal range of motion. Right lower leg: No edema. Left lower leg: No edema. Skin:     General: Skin is warm and dry. Neurological:      General: No focal deficit present. Mental Status: He is alert. Cranial Nerves: No cranial nerve deficit. Sensory: No sensory deficit. Psychiatric:         Mood and Affect: Mood normal.         Electronically signed by Pauline Diaz MD on 12/12/2022 at 8:22 AM.    This dictation was generated by voice recognition computer software. Although all attempts are made to edit the dictation for accuracy, there may be errors in the transcription that are not intended.

## 2022-12-12 NOTE — ASSESSMENT & PLAN NOTE
Improved, still occasionally gets mild episodes of epistaxis however has been trying to maintain humidity and living environment to avoid dryness

## 2022-12-12 NOTE — ASSESSMENT & PLAN NOTE
Reinforced recommendations for weight loss, discussed with patient need to cut back on total calorie intake, cut back on carbs and maintain regular level of physical activity preferably with 30 minutes of cardio at least 3 times a week

## 2022-12-12 NOTE — ASSESSMENT & PLAN NOTE
Diabetes continue to be stable and well-controlled, hemoglobin A1c results reviewed and discussed with patient, diet and exercise recommendations reinforced, need for yearly ophthalmology exam explained to patient, continues to be without any evidence of neuropathy, foot care recommendations discussed with patient  Follow-up in 6 months if insurance does not change as patient is reporting may need to find new PCP as insurance may no longer be affiliated with [facility] ([facility]).   Given 6 months supply of meds

## 2023-06-03 DIAGNOSIS — Z79.4 TYPE 2 DIABETES MELLITUS WITHOUT COMPLICATION, WITH LONG-TERM CURRENT USE OF INSULIN (HCC): ICD-10-CM

## 2023-06-03 DIAGNOSIS — E11.9 TYPE 2 DIABETES MELLITUS WITHOUT COMPLICATION, WITH LONG-TERM CURRENT USE OF INSULIN (HCC): ICD-10-CM

## 2023-06-05 RX ORDER — INSULIN LISPRO 100 [IU]/ML
INJECTION, SOLUTION INTRAVENOUS; SUBCUTANEOUS
Qty: 15 ML | Refills: 0 | Status: SHIPPED | OUTPATIENT
Start: 2023-06-05

## 2023-06-12 DIAGNOSIS — E78.2 MIXED HYPERLIPIDEMIA: ICD-10-CM

## 2023-06-12 DIAGNOSIS — Z79.4 TYPE 2 DIABETES MELLITUS WITHOUT COMPLICATION, WITH LONG-TERM CURRENT USE OF INSULIN (HCC): ICD-10-CM

## 2023-06-12 DIAGNOSIS — Z00.00 ANNUAL PHYSICAL EXAM: ICD-10-CM

## 2023-06-12 DIAGNOSIS — E11.9 TYPE 2 DIABETES MELLITUS WITHOUT COMPLICATION, WITH LONG-TERM CURRENT USE OF INSULIN (HCC): ICD-10-CM

## 2023-06-12 LAB
ALBUMIN SERPL-MCNC: 4.4 G/DL (ref 3.4–5)
ALBUMIN/GLOB SERPL: 1.4 {RATIO} (ref 1.1–2.2)
ALP SERPL-CCNC: 98 U/L (ref 40–129)
ALT SERPL-CCNC: 14 U/L (ref 10–40)
ANION GAP SERPL CALCULATED.3IONS-SCNC: 13 MMOL/L (ref 3–16)
AST SERPL-CCNC: 20 U/L (ref 15–37)
BILIRUB SERPL-MCNC: 0.6 MG/DL (ref 0–1)
BUN SERPL-MCNC: 19 MG/DL (ref 7–20)
CALCIUM SERPL-MCNC: 9.7 MG/DL (ref 8.3–10.6)
CHLORIDE SERPL-SCNC: 103 MMOL/L (ref 99–110)
CHOLEST SERPL-MCNC: 120 MG/DL (ref 0–199)
CO2 SERPL-SCNC: 25 MMOL/L (ref 21–32)
CREAT SERPL-MCNC: 0.8 MG/DL (ref 0.8–1.3)
CREAT UR-MCNC: 186.6 MG/DL (ref 39–259)
DEPRECATED RDW RBC AUTO: 13.2 % (ref 12.4–15.4)
GFR SERPLBLD CREATININE-BSD FMLA CKD-EPI: >60 ML/MIN/{1.73_M2}
GLUCOSE SERPL-MCNC: 105 MG/DL (ref 70–99)
HCT VFR BLD AUTO: 46.3 % (ref 40.5–52.5)
HDLC SERPL-MCNC: 52 MG/DL (ref 40–60)
HGB BLD-MCNC: 15.4 G/DL (ref 13.5–17.5)
LDLC SERPL CALC-MCNC: 51 MG/DL
MCH RBC QN AUTO: 29.8 PG (ref 26–34)
MCHC RBC AUTO-ENTMCNC: 33.3 G/DL (ref 31–36)
MCV RBC AUTO: 89.6 FL (ref 80–100)
MICROALBUMIN UR DL<=1MG/L-MCNC: <1.2 MG/DL
MICROALBUMIN/CREAT UR: NORMAL MG/G (ref 0–30)
PLATELET # BLD AUTO: 285 K/UL (ref 135–450)
PMV BLD AUTO: 8.5 FL (ref 5–10.5)
POTASSIUM SERPL-SCNC: 4.7 MMOL/L (ref 3.5–5.1)
PROT SERPL-MCNC: 7.6 G/DL (ref 6.4–8.2)
PSA SERPL DL<=0.01 NG/ML-MCNC: 0.46 NG/ML (ref 0–4)
RBC # BLD AUTO: 5.17 M/UL (ref 4.2–5.9)
SODIUM SERPL-SCNC: 141 MMOL/L (ref 136–145)
TRIGL SERPL-MCNC: 87 MG/DL (ref 0–150)
VLDLC SERPL CALC-MCNC: 17 MG/DL
WBC # BLD AUTO: 7.8 K/UL (ref 4–11)

## 2023-06-13 LAB
EST. AVERAGE GLUCOSE BLD GHB EST-MCNC: 128.4 MG/DL
HBA1C MFR BLD: 6.1 %

## 2023-07-06 ENCOUNTER — TELEPHONE (OUTPATIENT)
Dept: ADMINISTRATIVE | Age: 63
End: 2023-07-06

## 2023-07-06 NOTE — TELEPHONE ENCOUNTER
Submitted PA for . [employer] 2 Sensor  Via [employer] Key: [id number] STATUS: \"Drug is covered by current benefit plan. No further PA activity needed. \"    Follow up done daily; if no response in three days we will refax for status check. If another three days goes by with no response we will call the insurance for status. If this requires a response please respond to the pool. [facility] [place]). Please advise patient thank you.

## 2023-07-12 PROBLEM — Z00.00 ANNUAL PHYSICAL EXAM: Status: RESOLVED | Noted: 2022-06-06 | Resolved: 2023-07-12

## 2023-08-07 DIAGNOSIS — E11.9 TYPE 2 DIABETES MELLITUS WITHOUT COMPLICATION, WITH LONG-TERM CURRENT USE OF INSULIN (HCC): ICD-10-CM

## 2023-08-07 DIAGNOSIS — Z79.4 TYPE 2 DIABETES MELLITUS WITHOUT COMPLICATION, WITH LONG-TERM CURRENT USE OF INSULIN (HCC): ICD-10-CM

## 2023-08-07 RX ORDER — EMPAGLIFLOZIN 25 MG/1
TABLET, FILM COATED ORAL
Qty: 90 TABLET | Refills: 3 | Status: SHIPPED | OUTPATIENT
Start: 2023-08-07

## 2023-08-14 NOTE — TELEPHONE ENCOUNTER
Fax received from [place] stating that patient called pharmacy to inquire about why he was charged $74.99 for his last fill of the Dg Ami, when he typically pays $25.     Rasheeda called [employer] and they were told that patient will need a new prior authorization in order to fix the discrepancy. Please complete another PA.

## 2023-08-15 DIAGNOSIS — E11.9 TYPE 2 DIABETES MELLITUS WITHOUT COMPLICATION, WITH LONG-TERM CURRENT USE OF INSULIN (HCC): ICD-10-CM

## 2023-08-15 DIAGNOSIS — Z79.4 TYPE 2 DIABETES MELLITUS WITHOUT COMPLICATION, WITH LONG-TERM CURRENT USE OF INSULIN (HCC): ICD-10-CM

## 2023-08-15 RX ORDER — INSULIN LISPRO 100 [IU]/ML
INJECTION, SOLUTION INTRAVENOUS; SUBCUTANEOUS
Qty: 15 ML | Refills: 4 | Status: SHIPPED | OUTPATIENT
Start: 2023-08-15

## 2023-08-16 DIAGNOSIS — Z79.4 TYPE 2 DIABETES MELLITUS WITHOUT COMPLICATION, WITH LONG-TERM CURRENT USE OF INSULIN (HCC): ICD-10-CM

## 2023-08-16 DIAGNOSIS — E78.2 MIXED HYPERLIPIDEMIA: ICD-10-CM

## 2023-08-16 DIAGNOSIS — E11.9 TYPE 2 DIABETES MELLITUS WITHOUT COMPLICATION, WITH LONG-TERM CURRENT USE OF INSULIN (HCC): ICD-10-CM

## 2023-08-16 RX ORDER — ATORVASTATIN CALCIUM 20 MG/1
TABLET, FILM COATED ORAL
Qty: 90 TABLET | Refills: 3 | Status: SHIPPED | OUTPATIENT
Start: 2023-08-16

## 2023-08-16 RX ORDER — LISINOPRIL 2.5 MG/1
TABLET ORAL
Qty: 90 TABLET | Refills: 3 | Status: SHIPPED | OUTPATIENT
Start: 2023-08-16

## 2023-08-21 ENCOUNTER — PATIENT MESSAGE (OUTPATIENT)
Dept: INTERNAL MEDICINE CLINIC | Age: 63
End: 2023-08-21

## 2023-08-21 NOTE — TELEPHONE ENCOUNTER
The insurance did cover some of the cost; what the patient needs is a TIER REDUCTION. The full cost of the sensors is 238. 98. This is done by the patient with the insurance company. If this requires a response please respond to the pool ( P [facility] [address]). Thank you please advise patient.

## 2023-08-21 NOTE — TELEPHONE ENCOUNTER
Following up on second PA request.     Also, patient requests copy of approval from insurance. I did not see copy uploaded into patient's chart.

## 2023-08-21 NOTE — TELEPHONE ENCOUNTER
Spoke with patient. He was able to see message written from PA Dept. Patient states that he spoke with [place]. Please see [employer] message that patient recently sent to the office:    \"I just saw the note from [address] [facility] [address] and I spoke with [place]. The monique I spoke with said whoever called them on 7/6/23 received incorrect information from whoever they spoke with. Prior authorization is still needed. He said it can be submitted online at [email or web address]     Sorry for the hassle, but would you please try submitting the prior authorization request again through that website? Thanks\"    Please resubmit PA using [employer] Messenger.

## 2023-08-22 ENCOUNTER — OFFICE VISIT (OUTPATIENT)
Dept: INTERNAL MEDICINE CLINIC | Age: 63
End: 2023-08-22
Payer: COMMERCIAL

## 2023-08-22 VITALS
BODY MASS INDEX: 35.85 KG/M2 | WEIGHT: 250.4 LBS | SYSTOLIC BLOOD PRESSURE: 114 MMHG | OXYGEN SATURATION: 95 % | HEART RATE: 82 BPM | HEIGHT: 70 IN | DIASTOLIC BLOOD PRESSURE: 72 MMHG

## 2023-08-22 DIAGNOSIS — E11.9 TYPE 2 DIABETES MELLITUS WITHOUT COMPLICATION, WITH LONG-TERM CURRENT USE OF INSULIN (HCC): Primary | ICD-10-CM

## 2023-08-22 DIAGNOSIS — Z71.3 WEIGHT LOSS COUNSELING, ENCOUNTER FOR: ICD-10-CM

## 2023-08-22 DIAGNOSIS — Z79.4 TYPE 2 DIABETES MELLITUS WITHOUT COMPLICATION, WITH LONG-TERM CURRENT USE OF INSULIN (HCC): Primary | ICD-10-CM

## 2023-08-22 PROCEDURE — 99214 OFFICE O/P EST MOD 30 MIN: CPT | Performed by: INTERNAL MEDICINE

## 2023-08-22 PROCEDURE — 3044F HG A1C LEVEL LT 7.0%: CPT | Performed by: INTERNAL MEDICINE

## 2023-08-22 RX ORDER — TIRZEPATIDE 7.5 MG/.5ML
7.5 INJECTION, SOLUTION SUBCUTANEOUS WEEKLY
Qty: 4 ADJUSTABLE DOSE PRE-FILLED PEN SYRINGE | Refills: 1 | Status: SHIPPED | OUTPATIENT
Start: 2023-08-22

## 2023-08-22 ASSESSMENT — ENCOUNTER SYMPTOMS
COLOR CHANGE: 0
BACK PAIN: 0
SORE THROAT: 0
SHORTNESS OF BREATH: 0
WHEEZING: 0
ABDOMINAL PAIN: 0
VOMITING: 0
COUGH: 0
NAUSEA: 0
CHEST TIGHTNESS: 0
CONSTIPATION: 0

## 2023-08-22 NOTE — TELEPHONE ENCOUNTER
Submitted PA for [facility] Key: [id number] STATUS: Drug is covered by current benefit plan. No further PA activity needed. Follow up done daily; if no response in three days we will refax for status check. If another three days goes by with no response we will call the insurance for status.

## 2023-08-22 NOTE — ASSESSMENT & PLAN NOTE
patient did restart Jardiance, advised to continue same even with the higher dose of 1 genital and to only discontinue if starts experiencing hypoglycemic episodes. Reinforced recommendations to adhere to low-carb diet and continue attempts with exercise and active lifestyle, will increase Mounjaro dose to 7.5 mg since tolerating the 5 mg well, will reevaluate in 8 weeks for dose adjustment again.   Patient did update diabetic eye exam and continues to be without any evidence of retinopathy

## 2023-08-22 NOTE — ASSESSMENT & PLAN NOTE
as noted above reinforced diet and exercise recommendations, will titrate Mounjaro dose up to 7.5 mg and reevaluate in 8 weeks

## 2023-08-23 NOTE — TELEPHONE ENCOUNTER
Eusebio Donato and spoke with Norma Mukherjee. She advised that prior authorization is completed on [facility]. Advised that we have tried running it via [employer] x 2. She ran test claims on the freestyle ursula to see if there is an issue with claim submission. She ran claim for 90 day and 28 day supply. It is showing PA is needed. Placed PA over the phone. Case went into review. Reports that this can take up to 8 days for review. Decision will be faxed. She advised that she ran 28 day supply as preferred.    Case ID# [id number]

## 2023-08-24 NOTE — TELEPHONE ENCOUNTER
The medication is APPROVED. Dates 8/23/23 - 8/22/24. Approval letter available in media. If this requires a response please respond to the pool ( P [facility] [address]). Thank you please advise patient.

## 2023-10-10 DIAGNOSIS — E11.9 TYPE 2 DIABETES MELLITUS WITHOUT COMPLICATION, WITH LONG-TERM CURRENT USE OF INSULIN (HCC): ICD-10-CM

## 2023-10-10 DIAGNOSIS — Z79.4 TYPE 2 DIABETES MELLITUS WITHOUT COMPLICATION, WITH LONG-TERM CURRENT USE OF INSULIN (HCC): ICD-10-CM

## 2023-10-10 DIAGNOSIS — Z71.3 WEIGHT LOSS COUNSELING, ENCOUNTER FOR: ICD-10-CM

## 2023-10-10 RX ORDER — TIRZEPATIDE 7.5 MG/.5ML
INJECTION, SOLUTION SUBCUTANEOUS
Qty: 2 ML | Refills: 3 | Status: SHIPPED | OUTPATIENT
Start: 2023-10-10

## 2023-10-17 ENCOUNTER — OFFICE VISIT (OUTPATIENT)
Dept: INTERNAL MEDICINE CLINIC | Age: 63
End: 2023-10-17
Payer: COMMERCIAL

## 2023-10-17 VITALS
HEART RATE: 80 BPM | WEIGHT: 244 LBS | BODY MASS INDEX: 35.13 KG/M2 | SYSTOLIC BLOOD PRESSURE: 104 MMHG | DIASTOLIC BLOOD PRESSURE: 62 MMHG | OXYGEN SATURATION: 96 %

## 2023-10-17 DIAGNOSIS — Z71.3 WEIGHT LOSS COUNSELING, ENCOUNTER FOR: ICD-10-CM

## 2023-10-17 DIAGNOSIS — Z79.4 TYPE 2 DIABETES MELLITUS WITHOUT COMPLICATION, WITH LONG-TERM CURRENT USE OF INSULIN (HCC): Primary | ICD-10-CM

## 2023-10-17 DIAGNOSIS — Z23 NEED FOR PNEUMOCOCCAL VACCINATION: ICD-10-CM

## 2023-10-17 DIAGNOSIS — E11.9 TYPE 2 DIABETES MELLITUS WITHOUT COMPLICATION, WITH LONG-TERM CURRENT USE OF INSULIN (HCC): Primary | ICD-10-CM

## 2023-10-17 PROCEDURE — 99214 OFFICE O/P EST MOD 30 MIN: CPT | Performed by: INTERNAL MEDICINE

## 2023-10-17 PROCEDURE — 3044F HG A1C LEVEL LT 7.0%: CPT | Performed by: INTERNAL MEDICINE

## 2023-10-17 PROCEDURE — 90471 IMMUNIZATION ADMIN: CPT | Performed by: INTERNAL MEDICINE

## 2023-10-17 PROCEDURE — 90677 PCV20 VACCINE IM: CPT | Performed by: INTERNAL MEDICINE

## 2023-10-17 RX ORDER — TIRZEPATIDE 10 MG/.5ML
10 INJECTION, SOLUTION SUBCUTANEOUS WEEKLY
Qty: 4 ADJUSTABLE DOSE PRE-FILLED PEN SYRINGE | Refills: 2 | Status: SHIPPED | OUTPATIENT
Start: 2023-10-17

## 2023-10-17 ASSESSMENT — ENCOUNTER SYMPTOMS
CONSTIPATION: 0
WHEEZING: 0
VOMITING: 0
BACK PAIN: 0
COLOR CHANGE: 0
NAUSEA: 0
SORE THROAT: 0
SHORTNESS OF BREATH: 0
CHEST TIGHTNESS: 0
COUGH: 0
ABDOMINAL PAIN: 0
DIARRHEA: 1

## 2023-10-17 NOTE — ASSESSMENT & PLAN NOTE
doing well, reporting average blood sugar readings mid 90s on his machine, still on metformin and Jardiance along with Mounjaro. Occasional diarrhea but no major side effects, will go ahead and increase dose of Mounjaro from 7.5 mg to 10 mg/week, will reevaluate in 12 weeks. Encouraged to maintain adequate hydration, continue attempts to maintain healthy low-carb low-fat diet and increase level of physical activity, he will get biometric screen through his work next months, will update reminder of his lab work at his follow-up visit if needed. Patient will complete pneumonia vaccine this visit, he already got flu vaccine and COVID booster in September.   He is up-to-date with ophthalmology exam and continues to deny any any symptoms of neuropathy

## 2023-10-17 NOTE — ASSESSMENT & PLAN NOTE
diet and exercise recommendations reinforced, will increase Mounjaro dose and so far has good tolerance without major side effects except for occasional diarrhea, there is 6 pounds weight loss over the past 8 weeks by office scale however he is reporting 10 pounds weight loss by home scale.   We will reevaluate in 12 weeks

## 2023-10-26 ENCOUNTER — PATIENT MESSAGE (OUTPATIENT)
Dept: INTERNAL MEDICINE CLINIC | Age: 63
End: 2023-10-26

## 2023-10-27 ENCOUNTER — TELEPHONE (OUTPATIENT)
Dept: INTERNAL MEDICINE CLINIC | Age: 63
End: 2023-10-27

## 2023-10-27 NOTE — TELEPHONE ENCOUNTER
From: Magdalena Kwong  To: Dr. Staples Arm: 10/26/2023 5:23 PM EDT  Subject: Yuan [place] 10mg    The pharmacy says that prescription for the increased dosage needs prior authorization from [facility] before it can be filled. Would you please request the authorization.  Thanks

## 2023-10-27 NOTE — TELEPHONE ENCOUNTER
Submitted PA for MOUNJARO 10MG  [facility]Key: [id number]) STATUS: PENDING. Follow up done daily; if no response in three days we will refax for status check. If another three days goes by with no response we will call the insurance for status.

## 2023-10-30 NOTE — TELEPHONE ENCOUNTER
The medication is APPROVED FROM 10/27/23 THRU 10/26/24    If this requires a response please respond to the pool ( P [facility] [address]). Thank you please advise patient.

## 2023-10-30 NOTE — TELEPHONE ENCOUNTER
Refill too soon--- when patient is done with 7.5 he will be able to  the 10. Called patient and informed.

## 2023-11-08 DIAGNOSIS — Z79.4 TYPE 2 DIABETES MELLITUS WITHOUT COMPLICATION, WITH LONG-TERM CURRENT USE OF INSULIN (HCC): ICD-10-CM

## 2023-11-08 DIAGNOSIS — E11.9 TYPE 2 DIABETES MELLITUS WITHOUT COMPLICATION, WITH LONG-TERM CURRENT USE OF INSULIN (HCC): ICD-10-CM

## 2023-11-22 ENCOUNTER — PATIENT MESSAGE (OUTPATIENT)
Dept: INTERNAL MEDICINE CLINIC | Age: 63
End: 2023-11-22

## 2023-11-22 NOTE — TELEPHONE ENCOUNTER
From: Didi Montano  To: Dr. Addison Cronin: 11/22/2023 9:05 AM EST  Subject: Results from lab    Attached are the lab results from my biometric screening at work. Please let me know if you have any questions.  Thanks

## 2023-12-06 ENCOUNTER — PATIENT MESSAGE (OUTPATIENT)
Dept: INTERNAL MEDICINE CLINIC | Age: 63
End: 2023-12-06

## 2023-12-06 NOTE — TELEPHONE ENCOUNTER
From: Andrew Palma  To: Dr. Zapata Russell: 12/6/2023 7:14 AM EST  Subject: Reschedule 12/11 appointment     I think we need to reschedule my appointment currently set for next Monday. Because of the delay in getting insurance approval of the 10 mg dosage of Mounjaro, Herbert only taken it for the last 3 weeks (insurance required me to finish my prescription for the 7.5 mg dose before letting me start the 10mg dose). Because the plan was for me to take the 10mg dose for a few months before the next appointment, I think we should push the appointment for 5-6 weeks.  Thanks

## 2023-12-29 DIAGNOSIS — Z79.4 TYPE 2 DIABETES MELLITUS WITHOUT COMPLICATION, WITH LONG-TERM CURRENT USE OF INSULIN (HCC): ICD-10-CM

## 2023-12-29 DIAGNOSIS — E11.9 TYPE 2 DIABETES MELLITUS WITHOUT COMPLICATION, WITH LONG-TERM CURRENT USE OF INSULIN (HCC): ICD-10-CM

## 2024-01-02 RX ORDER — PEN NEEDLE, DIABETIC 32GX 5/32"
NEEDLE, DISPOSABLE MISCELLANEOUS
Qty: 90 EACH | Refills: 14 | Status: SHIPPED | OUTPATIENT
Start: 2024-01-02

## 2024-01-27 DIAGNOSIS — Z79.4 TYPE 2 DIABETES MELLITUS WITHOUT COMPLICATION, WITH LONG-TERM CURRENT USE OF INSULIN (HCC): ICD-10-CM

## 2024-01-27 DIAGNOSIS — E11.9 TYPE 2 DIABETES MELLITUS WITHOUT COMPLICATION, WITH LONG-TERM CURRENT USE OF INSULIN (HCC): ICD-10-CM

## 2024-01-27 DIAGNOSIS — Z71.3 WEIGHT LOSS COUNSELING, ENCOUNTER FOR: ICD-10-CM

## 2024-01-29 ENCOUNTER — TELEPHONE (OUTPATIENT)
Dept: INTERNAL MEDICINE CLINIC | Age: 64
End: 2024-01-29

## 2024-01-29 DIAGNOSIS — Z79.4 TYPE 2 DIABETES MELLITUS WITHOUT COMPLICATION, WITH LONG-TERM CURRENT USE OF INSULIN (HCC): ICD-10-CM

## 2024-01-29 DIAGNOSIS — E11.9 TYPE 2 DIABETES MELLITUS WITHOUT COMPLICATION, WITH LONG-TERM CURRENT USE OF INSULIN (HCC): ICD-10-CM

## 2024-01-29 RX ORDER — TIRZEPATIDE 10 MG/.5ML
INJECTION, SOLUTION SUBCUTANEOUS
Qty: 2 ML | Refills: 0 | Status: SHIPPED | OUTPATIENT
Start: 2024-01-29

## 2024-01-29 NOTE — TELEPHONE ENCOUNTER
Pharmacy  calling requesting refill of Pepe 2 sensors    Last written 4/14/23  Last OV 10/17/23  Next OV 2/12/24  Last recommended OV NA     Please send to [facility] [address]

## 2024-02-12 ENCOUNTER — OFFICE VISIT (OUTPATIENT)
Dept: INTERNAL MEDICINE CLINIC | Age: 64
End: 2024-02-12
Payer: COMMERCIAL

## 2024-02-12 VITALS
HEART RATE: 79 BPM | BODY MASS INDEX: 34.55 KG/M2 | SYSTOLIC BLOOD PRESSURE: 100 MMHG | DIASTOLIC BLOOD PRESSURE: 66 MMHG | OXYGEN SATURATION: 96 % | WEIGHT: 240 LBS

## 2024-02-12 DIAGNOSIS — Z71.3 WEIGHT LOSS COUNSELING, ENCOUNTER FOR: ICD-10-CM

## 2024-02-12 DIAGNOSIS — E78.2 MIXED HYPERLIPIDEMIA: ICD-10-CM

## 2024-02-12 DIAGNOSIS — Z79.4 TYPE 2 DIABETES MELLITUS WITHOUT COMPLICATION, WITH LONG-TERM CURRENT USE OF INSULIN (HCC): Primary | ICD-10-CM

## 2024-02-12 DIAGNOSIS — E11.9 TYPE 2 DIABETES MELLITUS WITHOUT COMPLICATION, WITH LONG-TERM CURRENT USE OF INSULIN (HCC): Primary | ICD-10-CM

## 2024-02-12 PROCEDURE — 99214 OFFICE O/P EST MOD 30 MIN: CPT | Performed by: INTERNAL MEDICINE

## 2024-02-12 RX ORDER — TIRZEPATIDE 12.5 MG/.5ML
12.5 INJECTION, SOLUTION SUBCUTANEOUS WEEKLY
Qty: 4 ADJUSTABLE DOSE PRE-FILLED PEN SYRINGE | Refills: 2 | Status: SHIPPED | OUTPATIENT
Start: 2024-02-12

## 2024-02-12 NOTE — ASSESSMENT & PLAN NOTE
will increase Mounjaro dose to 12.5 mg, reinforced recommendations for diet and exercise in addition to medication management, will reevaluate in 12 weeks or sooner

## 2024-02-12 NOTE — ASSESSMENT & PLAN NOTE
patient had updated lab work through biometric screening for work and send results through [employer], diabetes continues to be very well-controlled, continue current medication regimen and reevaluate in 3 months

## 2024-02-12 NOTE — PROGRESS NOTES
ASSESSMENT/PLAN:  1. Type 2 diabetes mellitus without complication, with long-term current use of insulin ([facility])  Assessment & Plan:    patient had updated lab work through biometric screening for work and send results through [facility], diabetes continues to be very well-controlled, continue current medication regimen and reevaluate in 3 months  Orders:  -     Tirzepatide (MOUNJARO) 12.5 MG/0.5ML SOPN SC injection; Inject 0.5 mLs into the skin once a week, Disp-4 Adjustable Dose Pre-filled Pen Syringe, R-2Normal  2. Weight loss counseling, encounter for  Assessment & Plan:    will increase Mounjaro dose to 12.5 mg, reinforced recommendations for diet and exercise in addition to medication management, will reevaluate in 12 weeks or sooner  Orders:  -     Tirzepatide (MOUNJARO) 12.5 MG/0.5ML SOPN SC injection; Inject 0.5 mLs into the skin once a week, Disp-4 Adjustable Dose Pre-filled Pen Syringe, R-2Normal  3. Mixed hyperlipidemia  Assessment & Plan:    continue statin therapy, labs reviewed, diet and exercise recommendations reinforced      Return in about 4 months (around 6/12/2024) for annual physical.     SUBJECTIVE  HPI:   Patient returns for 3 months follow-up on Mounjaro management and medication refills, reports blood sugars are doing excellent he is averaging consistently less than 110 on his freestyle ursula readings, has been tolerating Mounjaro 10 mg dose without any side effects, he kind of plateaued in terms of weight loss        Review of Systems   Constitutional:  Negative for activity change, appetite change and fatigue.   HENT:  Negative for congestion, hearing loss, mouth sores and sore throat.    Respiratory:  Negative for cough, chest tightness, shortness of breath and wheezing.    Cardiovascular:  Negative for chest pain, palpitations and leg swelling.   Gastrointestinal:  Negative for abdominal pain, constipation, nausea and vomiting.   Genitourinary:  Negative for difficulty urinating, dysuria,

## 2024-07-15 DIAGNOSIS — E11.9 TYPE 2 DIABETES MELLITUS WITHOUT COMPLICATION, WITH LONG-TERM CURRENT USE OF INSULIN (HCC): ICD-10-CM

## 2024-07-15 DIAGNOSIS — Z79.4 TYPE 2 DIABETES MELLITUS WITHOUT COMPLICATION, WITH LONG-TERM CURRENT USE OF INSULIN (HCC): ICD-10-CM

## 2024-07-15 RX ORDER — INSULIN LISPRO 100 [IU]/ML
INJECTION, SOLUTION INTRAVENOUS; SUBCUTANEOUS
Qty: 15 ML | Refills: 4 | Status: SHIPPED | OUTPATIENT
Start: 2024-07-15

## 2024-07-29 LAB — DIABETIC RETINOPATHY: NEGATIVE

## 2024-07-31 DIAGNOSIS — E11.9 TYPE 2 DIABETES MELLITUS WITHOUT COMPLICATION, WITH LONG-TERM CURRENT USE OF INSULIN (HCC): ICD-10-CM

## 2024-07-31 DIAGNOSIS — Z79.4 TYPE 2 DIABETES MELLITUS WITHOUT COMPLICATION, WITH LONG-TERM CURRENT USE OF INSULIN (HCC): ICD-10-CM

## 2024-07-31 RX ORDER — EMPAGLIFLOZIN 25 MG/1
TABLET, FILM COATED ORAL
Qty: 90 TABLET | Refills: 3 | Status: SHIPPED | OUTPATIENT
Start: 2024-07-31

## 2024-08-02 ENCOUNTER — TELEPHONE (OUTPATIENT)
Dept: INTERNAL MEDICINE CLINIC | Age: 64
End: 2024-08-02

## 2024-08-02 DIAGNOSIS — Z79.4 TYPE 2 DIABETES MELLITUS WITHOUT COMPLICATION, WITH LONG-TERM CURRENT USE OF INSULIN (HCC): ICD-10-CM

## 2024-08-02 DIAGNOSIS — E11.9 TYPE 2 DIABETES MELLITUS WITHOUT COMPLICATION, WITH LONG-TERM CURRENT USE OF INSULIN (HCC): ICD-10-CM

## 2024-08-02 DIAGNOSIS — Z71.3 WEIGHT LOSS COUNSELING, ENCOUNTER FOR: ICD-10-CM

## 2024-08-02 RX ORDER — TIRZEPATIDE 12.5 MG/.5ML
INJECTION, SOLUTION SUBCUTANEOUS
Qty: 2 ML | Refills: 0 | Status: SHIPPED | OUTPATIENT
Start: 2024-08-02

## 2024-08-02 NOTE — TELEPHONE ENCOUNTER
Patient pharmacy is out of Mounjaro 12.5 mg and does not expect to get it until the end of the month. Told patient they could order the 15 mg for next week if you will send script and if you are fine with him starting that dose

## 2024-08-02 NOTE — TELEPHONE ENCOUNTER
No need for higher dose, patient can wait until medicine is available again, he does have a follow-up appointment in 3 weeks and can address refills then

## 2024-08-05 ENCOUNTER — TELEMEDICINE (OUTPATIENT)
Dept: INTERNAL MEDICINE CLINIC | Age: 64
End: 2024-08-05
Payer: COMMERCIAL

## 2024-08-05 DIAGNOSIS — E11.9 TYPE 2 DIABETES MELLITUS WITHOUT COMPLICATION, WITH LONG-TERM CURRENT USE OF INSULIN (HCC): ICD-10-CM

## 2024-08-05 DIAGNOSIS — U07.1 COVID-19: Primary | ICD-10-CM

## 2024-08-05 DIAGNOSIS — Z79.4 TYPE 2 DIABETES MELLITUS WITHOUT COMPLICATION, WITH LONG-TERM CURRENT USE OF INSULIN (HCC): ICD-10-CM

## 2024-08-05 PROCEDURE — 99213 OFFICE O/P EST LOW 20 MIN: CPT | Performed by: INTERNAL MEDICINE

## 2024-08-05 SDOH — ECONOMIC STABILITY: FOOD INSECURITY: WITHIN THE PAST 12 MONTHS, THE FOOD YOU BOUGHT JUST DIDN'T LAST AND YOU DIDN'T HAVE MONEY TO GET MORE.: NEVER TRUE

## 2024-08-05 SDOH — ECONOMIC STABILITY: FOOD INSECURITY: WITHIN THE PAST 12 MONTHS, YOU WORRIED THAT YOUR FOOD WOULD RUN OUT BEFORE YOU GOT MONEY TO BUY MORE.: NEVER TRUE

## 2024-08-05 SDOH — ECONOMIC STABILITY: HOUSING INSECURITY
IN THE LAST 12 MONTHS, WAS THERE A TIME WHEN YOU DID NOT HAVE A STEADY PLACE TO SLEEP OR SLEPT IN A SHELTER (INCLUDING NOW)?: NO

## 2024-08-05 SDOH — ECONOMIC STABILITY: INCOME INSECURITY: HOW HARD IS IT FOR YOU TO PAY FOR THE VERY BASICS LIKE FOOD, HOUSING, MEDICAL CARE, AND HEATING?: NOT HARD AT ALL

## 2024-08-05 ASSESSMENT — ENCOUNTER SYMPTOMS
CHEST TIGHTNESS: 0
SORE THROAT: 1
COUGH: 1
SINUS PRESSURE: 1
CHOKING: 0

## 2024-08-05 NOTE — PROGRESS NOTES
Janak Dai, was evaluated through a synchronous (real-time) audio-video encounter. The patient (or guardian if applicable) is aware that this is a billable service, which includes applicable co-pays. This Virtual Visit was conducted with patient's (and/or legal guardian's) consent. Patient identification was verified, and a caregiver was present when appropriate.   The patient was located at Home: [id number]  [place] [address]  Provider was located at Facility (Appt Dept): [address] [place] [id number]  Confirm you are appropriately licensed, registered, or certified to deliver care in the state where the patient is located as indicated above. If you are not or unsure, please re-schedule the visit: Yes, I confirm.     Janak Dai (:  1960) is a Established patient, presenting virtually for evaluation of the following:    Assessment & Plan   Below is the assessment and plan developed based on review of pertinent history, physical exam, labs, studies, and medications.  1. COVID-19  Assessment & Plan:    supportive care measures discussed with patient, no red flag symptoms, advised we will go ahead and order Paxlovid to his pharmacy at his request since still within the 5 days window from onset however advised if symptoms continue to be mild to moderate and manageable with over-the-counter medications can hold on starting the Paxlovid.  If he decides to go ahead and start the Paxlovid he should hold his statin medication until he is completely done with the antiviral therapy.  Patient voiced understanding, can continue using over-the-counter DayQuil and NyQuil, continue as needed ibuprofen for pain and achiness, ensure adequate water intake  Orders:  -     nirmatrelvir/ritonavir 300/100 (PAXLOVID, 300/100,) 20 x 150 MG & 10 x 100MG TBPK; Take 3 tablets (two 150 mg nirmatrelvir and one 100 mg ritonavir tablets) by mouth every 12 hours for 5 days., Disp-30 tablet, R-0Normal  2. Type 2

## 2024-08-05 NOTE — ASSESSMENT & PLAN NOTE
supportive care measures discussed with patient, no red flag symptoms, advised we will go ahead and order Paxlovid to his pharmacy at his request since still within the 5 days window from onset however advised if symptoms continue to be mild to moderate and manageable with over-the-counter medications can hold on starting the Paxlovid.  If he decides to go ahead and start the Paxlovid he should hold his statin medication until he is completely done with the antiviral therapy.  Patient voiced understanding, can continue using over-the-counter DayQuil and NyQuil, continue as needed ibuprofen for pain and achiness, ensure adequate water intake

## 2024-08-05 NOTE — ASSESSMENT & PLAN NOTE
patient just used his last Mounjaro dose, advised to continue holding until recovers from COVID, he is scheduled to have a follow-up visit in 2 weeks, will reevaluate then, reinforced recommendations to adhere to healthy low-carb diet, ensure adequate hydration and continue all other meds except as noted above

## 2024-08-12 DIAGNOSIS — E11.9 TYPE 2 DIABETES MELLITUS WITHOUT COMPLICATION, WITH LONG-TERM CURRENT USE OF INSULIN (HCC): ICD-10-CM

## 2024-08-12 DIAGNOSIS — E78.2 MIXED HYPERLIPIDEMIA: ICD-10-CM

## 2024-08-12 DIAGNOSIS — Z79.4 TYPE 2 DIABETES MELLITUS WITHOUT COMPLICATION, WITH LONG-TERM CURRENT USE OF INSULIN (HCC): ICD-10-CM

## 2024-08-12 RX ORDER — LISINOPRIL 2.5 MG/1
TABLET ORAL
Qty: 90 TABLET | Refills: 3 | Status: SHIPPED | OUTPATIENT
Start: 2024-08-12

## 2024-08-12 RX ORDER — ATORVASTATIN CALCIUM 20 MG/1
TABLET, FILM COATED ORAL
Qty: 90 TABLET | Refills: 3 | Status: SHIPPED | OUTPATIENT
Start: 2024-08-12

## 2024-08-26 ENCOUNTER — OFFICE VISIT (OUTPATIENT)
Dept: INTERNAL MEDICINE CLINIC | Age: 64
End: 2024-08-26
Payer: COMMERCIAL

## 2024-08-26 VITALS
SYSTOLIC BLOOD PRESSURE: 98 MMHG | HEART RATE: 79 BPM | OXYGEN SATURATION: 98 % | WEIGHT: 246 LBS | DIASTOLIC BLOOD PRESSURE: 60 MMHG | BODY MASS INDEX: 35.42 KG/M2

## 2024-08-26 DIAGNOSIS — Z00.00 ANNUAL PHYSICAL EXAM: ICD-10-CM

## 2024-08-26 DIAGNOSIS — E11.9 TYPE 2 DIABETES MELLITUS WITHOUT COMPLICATION, WITH LONG-TERM CURRENT USE OF INSULIN (HCC): ICD-10-CM

## 2024-08-26 DIAGNOSIS — Z79.4 TYPE 2 DIABETES MELLITUS WITHOUT COMPLICATION, WITH LONG-TERM CURRENT USE OF INSULIN (HCC): ICD-10-CM

## 2024-08-26 DIAGNOSIS — Z71.3 WEIGHT LOSS COUNSELING, ENCOUNTER FOR: ICD-10-CM

## 2024-08-26 DIAGNOSIS — Z00.00 ANNUAL PHYSICAL EXAM: Primary | ICD-10-CM

## 2024-08-26 PROBLEM — U07.1 COVID-19: Status: RESOLVED | Noted: 2024-08-05 | Resolved: 2024-08-26

## 2024-08-26 LAB
ALBUMIN SERPL-MCNC: 4.1 G/DL (ref 3.4–5)
ALBUMIN/GLOB SERPL: 1.5 {RATIO} (ref 1.1–2.2)
ALP SERPL-CCNC: 94 U/L (ref 40–129)
ALT SERPL-CCNC: 21 U/L (ref 10–40)
ANION GAP SERPL CALCULATED.3IONS-SCNC: 11 MMOL/L (ref 3–16)
AST SERPL-CCNC: 22 U/L (ref 15–37)
BILIRUB SERPL-MCNC: 0.5 MG/DL (ref 0–1)
BUN SERPL-MCNC: 13 MG/DL (ref 7–20)
CALCIUM SERPL-MCNC: 9.1 MG/DL (ref 8.3–10.6)
CHLORIDE SERPL-SCNC: 102 MMOL/L (ref 99–110)
CHOLEST SERPL-MCNC: 143 MG/DL (ref 0–199)
CO2 SERPL-SCNC: 26 MMOL/L (ref 21–32)
CREAT SERPL-MCNC: 0.8 MG/DL (ref 0.8–1.3)
CREAT UR-MCNC: 136 MG/DL (ref 39–259)
DEPRECATED RDW RBC AUTO: 14.5 % (ref 12.4–15.4)
EST. AVERAGE GLUCOSE BLD GHB EST-MCNC: 114 MG/DL
GFR SERPLBLD CREATININE-BSD FMLA CKD-EPI: >90 ML/MIN/{1.73_M2}
GLUCOSE SERPL-MCNC: 103 MG/DL (ref 70–99)
HBA1C MFR BLD: 5.6 %
HCT VFR BLD AUTO: 40.8 % (ref 40.5–52.5)
HDLC SERPL-MCNC: 52 MG/DL (ref 40–60)
HGB BLD-MCNC: 13.5 G/DL (ref 13.5–17.5)
LDLC SERPL CALC-MCNC: 78 MG/DL
MCH RBC QN AUTO: 29.4 PG (ref 26–34)
MCHC RBC AUTO-ENTMCNC: 33.2 G/DL (ref 31–36)
MCV RBC AUTO: 88.5 FL (ref 80–100)
MICROALBUMIN UR DL<=1MG/L-MCNC: <1.2 MG/DL
MICROALBUMIN/CREAT UR: NORMAL MG/G (ref 0–30)
PLATELET # BLD AUTO: 348 K/UL (ref 135–450)
PMV BLD AUTO: 8.2 FL (ref 5–10.5)
POTASSIUM SERPL-SCNC: 4.5 MMOL/L (ref 3.5–5.1)
PROT SERPL-MCNC: 6.9 G/DL (ref 6.4–8.2)
PSA SERPL DL<=0.01 NG/ML-MCNC: 0.27 NG/ML (ref 0–4)
RBC # BLD AUTO: 4.61 M/UL (ref 4.2–5.9)
SODIUM SERPL-SCNC: 139 MMOL/L (ref 136–145)
TRIGL SERPL-MCNC: 66 MG/DL (ref 0–150)
TSH SERPL DL<=0.005 MIU/L-ACNC: 1.91 UIU/ML (ref 0.27–4.2)
VLDLC SERPL CALC-MCNC: 13 MG/DL
WBC # BLD AUTO: 7.4 K/UL (ref 4–11)

## 2024-08-26 PROCEDURE — 99396 PREV VISIT EST AGE 40-64: CPT | Performed by: INTERNAL MEDICINE

## 2024-08-26 PROCEDURE — 99214 OFFICE O/P EST MOD 30 MIN: CPT | Performed by: INTERNAL MEDICINE

## 2024-08-26 RX ORDER — TIRZEPATIDE 15 MG/.5ML
15 INJECTION, SOLUTION SUBCUTANEOUS WEEKLY
Qty: 6 ML | Refills: 1 | Status: SHIPPED | OUTPATIENT
Start: 2024-08-26

## 2024-08-26 ASSESSMENT — ENCOUNTER SYMPTOMS
CHEST TIGHTNESS: 0
VOMITING: 0
NAUSEA: 0
ABDOMINAL PAIN: 0
SORE THROAT: 0
WHEEZING: 0
SHORTNESS OF BREATH: 0
BACK PAIN: 0
COLOR CHANGE: 0
CONSTIPATION: 0
COUGH: 0

## 2024-08-26 NOTE — ASSESSMENT & PLAN NOTE
Counseling done again specially that patient did gain weight when he went off Mounjaro for 1 months, reemphasized need diet modification and exercise, reexplained to patient again Mounjaro is intended as adjunctive therapy in addition to diet and lifestyle modifications.  He was tolerating the 12.5 mg dose well without any side effects, currently the dose is not available, will attempt a 15 mg dose and reevaluate in 6 months.  Encouraged to call the office if any problems

## 2024-08-26 NOTE — ASSESSMENT & PLAN NOTE
Age-related health maintenance and immunization recommendations reviewed and discussed with patient, he is up-to-date with current vaccines, he recently had COVID infection 3 weeks ago and he is aware to wait 90 days before getting the new booster.  He will get flu vaccine through work  Labs ordered, healthy diet and regular exercise recommendations made to patient, BMI discussed with patient, see below  Patient up-to-date with with colonoscopy  PSA ordered this visit, continues to deny any urinary symptoms or concerns  Depression screen is negative  Follow-up in 6 months and as needed

## 2024-08-26 NOTE — PROGRESS NOTES
ASSESSMENT/PLAN:  1. Annual physical exam  Assessment & Plan:  Age-related health maintenance and immunization recommendations reviewed and discussed with patient, he is up-to-date with current vaccines, he recently had COVID infection 3 weeks ago and he is aware to wait 90 days before getting the new booster.  He will get flu vaccine through work  Labs ordered, healthy diet and regular exercise recommendations made to patient, BMI discussed with patient, see below  Patient up-to-date with with colonoscopy  PSA ordered this visit, continues to deny any urinary symptoms or concerns  Depression screen is negative  Follow-up in 6 months and as needed   Orders:  -     CBC; Future  -     Comprehensive Metabolic Panel; Future  -     Hemoglobin A1C; Future  -     Lipid Panel; Future  -     Microalbumin / Creatinine Urine Ratio; Future  -     TSH; Future  -     PSA, Prostatic Specific Antigen; Future  2. Type 2 diabetes mellitus without complication, with long-term current use of insulin (HCC)  Assessment & Plan:  Has been doing well on current medication regimen although there has been recent weight gain after he stopped the Mounjaro for 4 weeks due to in availability.  Will continue with NovoLog insulin which she has been using average between 15 to 20 units daily, continue metformin 2 g daily and Jardiance 25 mg daily.  Reinforced recommendations to adhere to healthy diet, continue monitoring carbs and continue active lifestyle with goal of 30 minutes of cardio 3 times a week  Patient up-to-date with ophthalmology exam  Foot exam completed this visit without any evidence of neuropathy, intact skin and sensation however he does have bilateral onychomycosis great toenails and dry scaly skin.  Skin care recommendations discussed with patient and encouraged to schedule with podiatry for footcare.   Orders:  -     MOUNJARO 15 MG/0.5ML SOPN SC injection; Inject 0.5 mLs into the skin once a week, Disp-6 mL, R-1, DAWNormal  -

## 2024-08-26 NOTE — ASSESSMENT & PLAN NOTE
Has been doing well on current medication regimen although there has been recent weight gain after he stopped the Mounjaro for 4 weeks due to in availability.  Will continue with NovoLog insulin which she has been using average between 15 to 20 units daily, continue metformin 2 g daily and Jardiance 25 mg daily.  Reinforced recommendations to adhere to healthy diet, continue monitoring carbs and continue active lifestyle with goal of 30 minutes of cardio 3 times a week  Patient up-to-date with ophthalmology exam  Foot exam completed this visit without any evidence of neuropathy, intact skin and sensation however he does have bilateral onychomycosis great toenails and dry scaly skin.  Skin care recommendations discussed with patient and encouraged to schedule with podiatry for footcare.

## 2024-09-25 PROBLEM — Z00.00 ANNUAL PHYSICAL EXAM: Status: RESOLVED | Noted: 2024-08-26 | Resolved: 2024-09-25

## 2024-10-29 ENCOUNTER — TELEPHONE (OUTPATIENT)
Dept: INTERNAL MEDICINE CLINIC | Age: 64
End: 2024-10-29

## 2024-10-29 NOTE — TELEPHONE ENCOUNTER
Submitted PA for MOUNJARO 15 MG/0.5ML SOPN SC injection   Via [facility] (Key: [id number]) STATUS: PENDING.    PA has already submitted and is in process for this patient and drug.;CaseId:;Status:;     [facility]. States whomever initiated did not submit documents. Advised this could be done on [facility] Prompt site. Tried to submit but there was a problem with submission. Called Jaden back. Spoke with Annette MILLER She will fax over the clinical questions to complete and fax back.    Follow up done daily; if no decision with in three days we will refax.  If another three days goes by with no decision will call the insurance for status.

## 2024-10-30 NOTE — TELEPHONE ENCOUNTER
The medication is APPROVED THRU 10/29/25    If this requires a response please respond to the pool ( P [facility]).      Thank you please advise patient.

## 2024-12-16 ENCOUNTER — PATIENT MESSAGE (OUTPATIENT)
Dept: INTERNAL MEDICINE CLINIC | Age: 64
End: 2024-12-16

## 2024-12-21 DIAGNOSIS — E11.9 TYPE 2 DIABETES MELLITUS WITHOUT COMPLICATION, WITH LONG-TERM CURRENT USE OF INSULIN (HCC): ICD-10-CM

## 2024-12-21 DIAGNOSIS — Z79.4 TYPE 2 DIABETES MELLITUS WITHOUT COMPLICATION, WITH LONG-TERM CURRENT USE OF INSULIN (HCC): ICD-10-CM

## 2025-01-02 ENCOUNTER — PATIENT MESSAGE (OUTPATIENT)
Dept: INTERNAL MEDICINE CLINIC | Age: 65
End: 2025-01-02

## 2025-01-02 DIAGNOSIS — E11.9 TYPE 2 DIABETES MELLITUS WITHOUT COMPLICATION, WITH LONG-TERM CURRENT USE OF INSULIN (HCC): Primary | ICD-10-CM

## 2025-01-02 DIAGNOSIS — Z79.4 TYPE 2 DIABETES MELLITUS WITHOUT COMPLICATION, WITH LONG-TERM CURRENT USE OF INSULIN (HCC): Primary | ICD-10-CM

## 2025-01-02 RX ORDER — ACYCLOVIR 400 MG/1
TABLET ORAL
Qty: 1 EACH | Refills: 0 | Status: SHIPPED | OUTPATIENT
Start: 2025-01-02

## 2025-01-02 RX ORDER — ACYCLOVIR 400 MG/1
TABLET ORAL
Qty: 6 EACH | Refills: 1 | Status: SHIPPED | OUTPATIENT
Start: 2025-01-02

## 2025-01-02 NOTE — TELEPHONE ENCOUNTER
Please order Dexcom G7 for patient. New Insurance will not cover Freestyle Pepe 2     Send back to me when done

## 2025-02-21 SDOH — ECONOMIC STABILITY: INCOME INSECURITY: IN THE LAST 12 MONTHS, WAS THERE A TIME WHEN YOU WERE NOT ABLE TO PAY THE MORTGAGE OR RENT ON TIME?: NO

## 2025-02-21 SDOH — ECONOMIC STABILITY: TRANSPORTATION INSECURITY
IN THE PAST 12 MONTHS, HAS THE LACK OF TRANSPORTATION KEPT YOU FROM MEDICAL APPOINTMENTS OR FROM GETTING MEDICATIONS?: NO

## 2025-02-21 SDOH — ECONOMIC STABILITY: TRANSPORTATION INSECURITY
IN THE PAST 12 MONTHS, HAS LACK OF TRANSPORTATION KEPT YOU FROM MEETINGS, WORK, OR FROM GETTING THINGS NEEDED FOR DAILY LIVING?: NO

## 2025-02-21 SDOH — ECONOMIC STABILITY: FOOD INSECURITY: WITHIN THE PAST 12 MONTHS, THE FOOD YOU BOUGHT JUST DIDN'T LAST AND YOU DIDN'T HAVE MONEY TO GET MORE.: NEVER TRUE

## 2025-02-21 SDOH — ECONOMIC STABILITY: FOOD INSECURITY: WITHIN THE PAST 12 MONTHS, YOU WORRIED THAT YOUR FOOD WOULD RUN OUT BEFORE YOU GOT MONEY TO BUY MORE.: NEVER TRUE

## 2025-02-21 ASSESSMENT — PATIENT HEALTH QUESTIONNAIRE - PHQ9
1. LITTLE INTEREST OR PLEASURE IN DOING THINGS: NOT AT ALL
SUM OF ALL RESPONSES TO PHQ9 QUESTIONS 1 & 2: 0
SUM OF ALL RESPONSES TO PHQ QUESTIONS 1-9: 0
1. LITTLE INTEREST OR PLEASURE IN DOING THINGS: NOT AT ALL
SUM OF ALL RESPONSES TO PHQ QUESTIONS 1-9: 0
2. FEELING DOWN, DEPRESSED OR HOPELESS: NOT AT ALL
SUM OF ALL RESPONSES TO PHQ QUESTIONS 1-9: 0
2. FEELING DOWN, DEPRESSED OR HOPELESS: NOT AT ALL
SUM OF ALL RESPONSES TO PHQ QUESTIONS 1-9: 0
SUM OF ALL RESPONSES TO PHQ9 QUESTIONS 1 & 2: 0

## 2025-02-24 ENCOUNTER — OFFICE VISIT (OUTPATIENT)
Dept: INTERNAL MEDICINE CLINIC | Age: 65
End: 2025-02-24
Payer: COMMERCIAL

## 2025-02-24 VITALS
BODY MASS INDEX: 34.21 KG/M2 | SYSTOLIC BLOOD PRESSURE: 104 MMHG | HEART RATE: 86 BPM | WEIGHT: 237.6 LBS | DIASTOLIC BLOOD PRESSURE: 68 MMHG | OXYGEN SATURATION: 99 %

## 2025-02-24 DIAGNOSIS — N20.1 URETEROLITHIASIS: ICD-10-CM

## 2025-02-24 DIAGNOSIS — Z79.4 TYPE 2 DIABETES MELLITUS WITHOUT COMPLICATION, WITH LONG-TERM CURRENT USE OF INSULIN (HCC): Primary | ICD-10-CM

## 2025-02-24 DIAGNOSIS — E78.2 MIXED HYPERLIPIDEMIA: ICD-10-CM

## 2025-02-24 DIAGNOSIS — Z79.4 TYPE 2 DIABETES MELLITUS WITHOUT COMPLICATION, WITH LONG-TERM CURRENT USE OF INSULIN (HCC): ICD-10-CM

## 2025-02-24 DIAGNOSIS — E11.9 TYPE 2 DIABETES MELLITUS WITHOUT COMPLICATION, WITH LONG-TERM CURRENT USE OF INSULIN (HCC): Primary | ICD-10-CM

## 2025-02-24 DIAGNOSIS — E11.9 TYPE 2 DIABETES MELLITUS WITHOUT COMPLICATION, WITH LONG-TERM CURRENT USE OF INSULIN (HCC): ICD-10-CM

## 2025-02-24 DIAGNOSIS — Z71.3 WEIGHT LOSS COUNSELING, ENCOUNTER FOR: ICD-10-CM

## 2025-02-24 PROCEDURE — 99214 OFFICE O/P EST MOD 30 MIN: CPT | Performed by: INTERNAL MEDICINE

## 2025-02-24 RX ORDER — TIRZEPATIDE 15 MG/.5ML
0.5 INJECTION, SOLUTION SUBCUTANEOUS WEEKLY
Qty: 6 ML | Refills: 1 | Status: SHIPPED | OUTPATIENT
Start: 2025-02-24

## 2025-02-24 RX ORDER — ACYCLOVIR 400 MG/1
TABLET ORAL
Qty: 6 EACH | Refills: 1 | Status: SHIPPED | OUTPATIENT
Start: 2025-02-24

## 2025-02-24 RX ORDER — LISINOPRIL 2.5 MG/1
2.5 TABLET ORAL DAILY
Qty: 90 TABLET | Refills: 3 | Status: SHIPPED | OUTPATIENT
Start: 2025-02-24

## 2025-02-24 RX ORDER — INSULIN LISPRO 100 [IU]/ML
7 INJECTION, SOLUTION INTRAVENOUS; SUBCUTANEOUS
Qty: 15 ML | Refills: 4 | Status: SHIPPED | OUTPATIENT
Start: 2025-02-24

## 2025-02-24 RX ORDER — ATORVASTATIN CALCIUM 20 MG/1
20 TABLET, FILM COATED ORAL DAILY
Qty: 90 TABLET | Refills: 3 | Status: SHIPPED | OUTPATIENT
Start: 2025-02-24

## 2025-02-24 ASSESSMENT — ENCOUNTER SYMPTOMS
WHEEZING: 0
NAUSEA: 0
SHORTNESS OF BREATH: 0
CHEST TIGHTNESS: 0
ABDOMINAL PAIN: 0
COUGH: 0
CONSTIPATION: 0
SORE THROAT: 0
BACK PAIN: 0
VOMITING: 0
SINUS PAIN: 0
COLOR CHANGE: 0

## 2025-02-24 NOTE — ASSESSMENT & PLAN NOTE
Diet and exercise recommendations reinforced, update labs, discussed with patient recommendations for 30 to 45 minutes of cardio exercise, continue atorvastatin and adjust dose pending lab results

## 2025-02-24 NOTE — PROGRESS NOTES
ASSESSMENT/PLAN:  1. Type 2 diabetes mellitus without complication, with long-term current use of insulin (HCC)  Assessment & Plan:  Continues to be stable and well-controlled on current medication regimen including Mounjaro.  He is denying any side effects and tolerating all therapies well.  He uses Dexcom glucometer and reports readings at target goal  Patient up-to-date with ophthalmology exam  Continue to denies any symptoms of neuropathy  Will update labs this visit and recheck in 6 months or sooner if needed   Orders:  -     Continuous Glucose Sensor (DEXCOM G7 SENSOR) MISC; Use as directed, Disp-6 each, R-1Normal  -     metFORMIN (GLUCOPHAGE) 500 MG tablet; Take 2 tablets by mouth 2 times daily (with meals), Disp-360 tablet, R-3Normal  -     empagliflozin (JARDIANCE) 25 MG tablet; Take 1 tablet by mouth daily, Disp-90 tablet, R-3Normal  -     insulin lispro, 1 Unit Dial, (HUMALOG KWIKPEN) 100 UNIT/ML SOPN; Inject 7 Units into the skin 3 times daily (before meals), Disp-15 mL, R-4Normal  -     lisinopril (PRINIVIL;ZESTRIL) 2.5 MG tablet; Take 1 tablet by mouth daily, Disp-90 tablet, R-3Normal  -     Hemoglobin A1C; Future  -     Lipid Panel; Future  -     Comprehensive Metabolic Panel; Future  2. Mixed hyperlipidemia  Assessment & Plan:  Diet and exercise recommendations reinforced, update labs, discussed with patient recommendations for 30 to 45 minutes of cardio exercise, continue atorvastatin and adjust dose pending lab results  Orders:  -     atorvastatin (LIPITOR) 20 MG tablet; Take 1 tablet by mouth daily, Disp-90 tablet, R-3Normal  -     Lipid Panel; Future  3. Weight loss counseling, encounter for  Assessment & Plan:  Continues to be on maximum dose of Mounjaro with good tolerance and denying any side effects, he continues to progressively lose weight although still not at target goal.  Reinforced recommendations for diet and exercise and reminded of need for lifestyle modification changes in addition

## 2025-02-24 NOTE — ASSESSMENT & PLAN NOTE
Continues to be stable and well-controlled on current medication regimen including Mounjaro.  He is denying any side effects and tolerating all therapies well.  He uses Dexcom glucometer and reports readings at target goal  Patient up-to-date with ophthalmology exam  Continue to denies any symptoms of neuropathy  Will update labs this visit and recheck in 6 months or sooner if needed

## 2025-02-24 NOTE — ASSESSMENT & PLAN NOTE
Continues to be on maximum dose of Mounjaro with good tolerance and denying any side effects, he continues to progressively lose weight although still not at target goal.  Reinforced recommendations for diet and exercise and reminded of need for lifestyle modification changes in addition to pharmaceutical therapy.  Will update labs this visit and reevaluate in 6 months or sooner if needed

## 2025-02-25 LAB
ALBUMIN SERPL-MCNC: 4.3 G/DL (ref 3.4–5)
ALBUMIN/GLOB SERPL: 1.5 {RATIO} (ref 1.1–2.2)
ALP SERPL-CCNC: 99 U/L (ref 40–129)
ALT SERPL-CCNC: 17 U/L (ref 10–40)
ANION GAP SERPL CALCULATED.3IONS-SCNC: 10 MMOL/L (ref 3–16)
AST SERPL-CCNC: 20 U/L (ref 15–37)
BILIRUB SERPL-MCNC: 0.5 MG/DL (ref 0–1)
BUN SERPL-MCNC: 15 MG/DL (ref 7–20)
CALCIUM SERPL-MCNC: 9.7 MG/DL (ref 8.3–10.6)
CHLORIDE SERPL-SCNC: 103 MMOL/L (ref 99–110)
CHOLEST SERPL-MCNC: 108 MG/DL (ref 0–199)
CO2 SERPL-SCNC: 27 MMOL/L (ref 21–32)
CREAT SERPL-MCNC: 0.9 MG/DL (ref 0.8–1.3)
EST. AVERAGE GLUCOSE BLD GHB EST-MCNC: 102.5 MG/DL
GFR SERPLBLD CREATININE-BSD FMLA CKD-EPI: >90 ML/MIN/{1.73_M2}
GLUCOSE SERPL-MCNC: 88 MG/DL (ref 70–99)
HBA1C MFR BLD: 5.2 %
HDLC SERPL-MCNC: 50 MG/DL (ref 40–60)
LDLC SERPL CALC-MCNC: 48 MG/DL
POTASSIUM SERPL-SCNC: 4.4 MMOL/L (ref 3.5–5.1)
PROT SERPL-MCNC: 7.2 G/DL (ref 6.4–8.2)
SODIUM SERPL-SCNC: 140 MMOL/L (ref 136–145)
TRIGL SERPL-MCNC: 51 MG/DL (ref 0–150)
VLDLC SERPL CALC-MCNC: 10 MG/DL

## 2025-03-11 DIAGNOSIS — Z79.4 TYPE 2 DIABETES MELLITUS WITHOUT COMPLICATION, WITH LONG-TERM CURRENT USE OF INSULIN (HCC): ICD-10-CM

## 2025-03-11 DIAGNOSIS — Z71.3 WEIGHT LOSS COUNSELING, ENCOUNTER FOR: ICD-10-CM

## 2025-03-11 DIAGNOSIS — E11.9 TYPE 2 DIABETES MELLITUS WITHOUT COMPLICATION, WITH LONG-TERM CURRENT USE OF INSULIN (HCC): ICD-10-CM

## 2025-03-11 RX ORDER — TIRZEPATIDE 15 MG/.5ML
INJECTION, SOLUTION SUBCUTANEOUS
Qty: 6 ML | Refills: 1 | Status: SHIPPED | OUTPATIENT
Start: 2025-03-11

## 2025-03-25 ENCOUNTER — PATIENT MESSAGE (OUTPATIENT)
Dept: INTERNAL MEDICINE CLINIC | Age: 65
End: 2025-03-25

## 2025-03-31 ENCOUNTER — PATIENT MESSAGE (OUTPATIENT)
Dept: INTERNAL MEDICINE CLINIC | Age: 65
End: 2025-03-31

## 2025-03-31 DIAGNOSIS — E11.9 TYPE 2 DIABETES MELLITUS WITHOUT COMPLICATION, WITH LONG-TERM CURRENT USE OF INSULIN: ICD-10-CM

## 2025-03-31 DIAGNOSIS — Z79.4 TYPE 2 DIABETES MELLITUS WITHOUT COMPLICATION, WITH LONG-TERM CURRENT USE OF INSULIN: ICD-10-CM

## 2025-03-31 RX ORDER — INSULIN LISPRO 100 [IU]/ML
7 INJECTION, SOLUTION INTRAVENOUS; SUBCUTANEOUS
Qty: 15 ML | Refills: 4 | Status: SHIPPED | OUTPATIENT
Start: 2025-03-31

## 2025-04-04 ENCOUNTER — CLINICAL SUPPORT (OUTPATIENT)
Dept: INTERNAL MEDICINE CLINIC | Age: 65
End: 2025-04-04

## 2025-06-16 DIAGNOSIS — Z79.4 TYPE 2 DIABETES MELLITUS WITHOUT COMPLICATION, WITH LONG-TERM CURRENT USE OF INSULIN (HCC): ICD-10-CM

## 2025-06-16 DIAGNOSIS — E11.9 TYPE 2 DIABETES MELLITUS WITHOUT COMPLICATION, WITH LONG-TERM CURRENT USE OF INSULIN (HCC): ICD-10-CM

## 2025-06-16 RX ORDER — ACYCLOVIR 400 MG/1
TABLET ORAL
Qty: 6 EACH | Refills: 1 | Status: SHIPPED | OUTPATIENT
Start: 2025-06-16

## 2025-07-28 DIAGNOSIS — E11.9 TYPE 2 DIABETES MELLITUS WITHOUT COMPLICATION, WITH LONG-TERM CURRENT USE OF INSULIN (HCC): ICD-10-CM

## 2025-07-28 DIAGNOSIS — Z79.4 TYPE 2 DIABETES MELLITUS WITHOUT COMPLICATION, WITH LONG-TERM CURRENT USE OF INSULIN (HCC): ICD-10-CM

## 2025-07-28 RX ORDER — PEN NEEDLE, DIABETIC 32GX 5/32"
1 NEEDLE, DISPOSABLE MISCELLANEOUS 4 TIMES DAILY
Qty: 100 EACH | Refills: 3 | Status: SHIPPED | OUTPATIENT
Start: 2025-07-28

## 2025-08-01 LAB — DIABETIC RETINOPATHY: POSITIVE

## 2025-08-25 ENCOUNTER — OFFICE VISIT (OUTPATIENT)
Dept: INTERNAL MEDICINE CLINIC | Age: 65
End: 2025-08-25
Payer: COMMERCIAL

## 2025-08-25 VITALS
SYSTOLIC BLOOD PRESSURE: 94 MMHG | OXYGEN SATURATION: 100 % | BODY MASS INDEX: 35.02 KG/M2 | HEART RATE: 74 BPM | DIASTOLIC BLOOD PRESSURE: 60 MMHG | HEIGHT: 70 IN | WEIGHT: 244.6 LBS

## 2025-08-25 DIAGNOSIS — Z00.00 ANNUAL PHYSICAL EXAM: Primary | ICD-10-CM

## 2025-08-25 DIAGNOSIS — Z79.4 TYPE 2 DIABETES MELLITUS WITHOUT COMPLICATION, WITH LONG-TERM CURRENT USE OF INSULIN (HCC): ICD-10-CM

## 2025-08-25 DIAGNOSIS — E11.9 TYPE 2 DIABETES MELLITUS WITHOUT COMPLICATION, WITH LONG-TERM CURRENT USE OF INSULIN (HCC): ICD-10-CM

## 2025-08-25 DIAGNOSIS — E78.2 MIXED HYPERLIPIDEMIA: ICD-10-CM

## 2025-08-25 DIAGNOSIS — Z00.00 ANNUAL PHYSICAL EXAM: ICD-10-CM

## 2025-08-25 LAB
ALBUMIN SERPL-MCNC: 4.2 G/DL (ref 3.4–5)
ALBUMIN/GLOB SERPL: 1.5 {RATIO} (ref 1.1–2.2)
ALP SERPL-CCNC: 91 U/L (ref 40–129)
ALT SERPL-CCNC: 16 U/L (ref 10–40)
ANION GAP SERPL CALCULATED.3IONS-SCNC: 11 MMOL/L (ref 3–16)
AST SERPL-CCNC: 22 U/L (ref 15–37)
BILIRUB SERPL-MCNC: 0.7 MG/DL (ref 0–1)
BUN SERPL-MCNC: 14 MG/DL (ref 7–20)
CALCIUM SERPL-MCNC: 9.5 MG/DL (ref 8.3–10.6)
CHLORIDE SERPL-SCNC: 105 MMOL/L (ref 99–110)
CHOLEST SERPL-MCNC: 106 MG/DL (ref 0–199)
CO2 SERPL-SCNC: 25 MMOL/L (ref 21–32)
CREAT SERPL-MCNC: 0.9 MG/DL (ref 0.8–1.3)
CREAT UR-MCNC: 193 MG/DL (ref 39–259)
DEPRECATED RDW RBC AUTO: 13.7 % (ref 12.4–15.4)
EST. AVERAGE GLUCOSE BLD GHB EST-MCNC: 105.4 MG/DL
GFR SERPLBLD CREATININE-BSD FMLA CKD-EPI: >90 ML/MIN/{1.73_M2}
GLUCOSE SERPL-MCNC: 89 MG/DL (ref 70–99)
HBA1C MFR BLD: 5.3 %
HCT VFR BLD AUTO: 41.9 % (ref 40.5–52.5)
HDLC SERPL-MCNC: 46 MG/DL (ref 40–60)
HGB BLD-MCNC: 14.5 G/DL (ref 13.5–17.5)
LDLC SERPL CALC-MCNC: 43 MG/DL
MCH RBC QN AUTO: 30.8 PG (ref 26–34)
MCHC RBC AUTO-ENTMCNC: 34.6 G/DL (ref 31–36)
MCV RBC AUTO: 88.8 FL (ref 80–100)
MICROALBUMIN UR DL<=1MG/L-MCNC: <1.2 MG/DL
MICROALBUMIN/CREAT UR: NORMAL MG/G (ref 0–30)
PLATELET # BLD AUTO: 264 K/UL (ref 135–450)
PMV BLD AUTO: 8.2 FL (ref 5–10.5)
POTASSIUM SERPL-SCNC: 4.2 MMOL/L (ref 3.5–5.1)
PROT SERPL-MCNC: 7 G/DL (ref 6.4–8.2)
PSA SERPL DL<=0.01 NG/ML-MCNC: 0.41 NG/ML (ref 0–4)
RBC # BLD AUTO: 4.72 M/UL (ref 4.2–5.9)
SODIUM SERPL-SCNC: 141 MMOL/L (ref 136–145)
TRIGL SERPL-MCNC: 84 MG/DL (ref 0–150)
TSH SERPL DL<=0.005 MIU/L-ACNC: 1.76 UIU/ML (ref 0.27–4.2)
VLDLC SERPL CALC-MCNC: 17 MG/DL
WBC # BLD AUTO: 6.9 K/UL (ref 4–11)

## 2025-08-25 PROCEDURE — 99214 OFFICE O/P EST MOD 30 MIN: CPT | Performed by: INTERNAL MEDICINE

## 2025-08-25 PROCEDURE — 99396 PREV VISIT EST AGE 40-64: CPT | Performed by: INTERNAL MEDICINE

## 2025-08-25 ASSESSMENT — ENCOUNTER SYMPTOMS
NAUSEA: 0
CHEST TIGHTNESS: 0
ABDOMINAL PAIN: 0
EYE DISCHARGE: 0
CONSTIPATION: 0
COUGH: 0
VOMITING: 0
SHORTNESS OF BREATH: 0
SORE THROAT: 0
COLOR CHANGE: 0
BACK PAIN: 0
WHEEZING: 0

## (undated) DEVICE — CYSTO/BLADDER IRRIGATION SET, REGULATING CLAMP

## (undated) DEVICE — GUIDEWIRE ENDOSCP L150CM DIA0.035IN TIP 3CM PTFE NIT

## (undated) DEVICE — SOLUTION IV IRRIG WATER 1000ML POUR BRL 2F7114

## (undated) DEVICE — CYSTO PACK: Brand: MEDLINE INDUSTRIES, INC.

## (undated) DEVICE — SYRINGE MED 10ML SLIP TIP BLNT FILL AND LUERLOCK DISP

## (undated) DEVICE — CATHETER URET 5FR L70CM OPN END SGL LUMN INJ HUB FLEXIMA

## (undated) DEVICE — BAG DRAINAGE NS

## (undated) DEVICE — SOLUTION IRRIGATION STRL H2O 1000 ML UROMATIC CONTAINER

## (undated) DEVICE — Device: Brand: MEDEX

## (undated) DEVICE — GLOVE ORANGE PI 7 1/2   MSG9075